# Patient Record
Sex: FEMALE | Race: BLACK OR AFRICAN AMERICAN | Employment: OTHER | URBAN - METROPOLITAN AREA
[De-identification: names, ages, dates, MRNs, and addresses within clinical notes are randomized per-mention and may not be internally consistent; named-entity substitution may affect disease eponyms.]

---

## 2017-01-10 ENCOUNTER — OFFICE VISIT (OUTPATIENT)
Dept: FAMILY MEDICINE CLINIC | Age: 47
End: 2017-01-10

## 2017-01-10 VITALS
SYSTOLIC BLOOD PRESSURE: 114 MMHG | OXYGEN SATURATION: 99 % | DIASTOLIC BLOOD PRESSURE: 80 MMHG | HEART RATE: 94 BPM | HEIGHT: 68 IN | BODY MASS INDEX: 26.52 KG/M2 | TEMPERATURE: 98 F | WEIGHT: 175 LBS

## 2017-01-10 DIAGNOSIS — R21 RASH: Primary | ICD-10-CM

## 2017-01-10 DIAGNOSIS — E66.3 OVERWEIGHT (BMI 25.0-29.9): ICD-10-CM

## 2017-01-10 DIAGNOSIS — B35.4 TINEA CORPORIS: ICD-10-CM

## 2017-01-10 RX ORDER — CLOTRIMAZOLE AND BETAMETHASONE DIPROPIONATE 10; .64 MG/G; MG/G
CREAM TOPICAL
Qty: 30 G | Refills: 0 | Status: SHIPPED | OUTPATIENT
Start: 2017-01-10 | End: 2017-04-03

## 2017-01-10 NOTE — MR AVS SNAPSHOT
Visit Information Date & Time Provider Department Dept. Phone Encounter #  
 1/10/2017 10:00 AM Carmel Tello, 0 [facility] [phone] [id number] Follow-up Instructions Return as needed. Upcoming Health Maintenance Date Due DTaP/Tdap/Td series (2 - Td) 1/1/2015 PAP AKA CERVICAL CYTOLOGY 5/5/2017 Allergies as of 1/10/2017  Review Complete On: 1/10/2017 By: Carmel Tello MD  
 No Known Allergies Current Immunizations  Reviewed on 12/9/2015 Name Date Influenza Nasal Vaccine 12/3/2014 Tdap 1/1/2005 Not reviewed this visit You Were Diagnosed With   
  
 Codes Comments Rash    -  Primary ICD-10-CM: R21 
ICD-9-CM: 782.1 Tinea corporis     ICD-10-CM: B35.4 ICD-9-CM: 110.5 Overweight (BMI 25.0-29. 9)     ICD-10-CM: R39.2 ICD-9-CM: 278.02 Vitals BP Pulse Temp Height(growth percentile) Weight(growth percentile) SpO2  
 114/80 (BP 1 Location: Left arm, BP Patient Position: Sitting) 94 98 °F (36.7 °C) (Oral) 5' 8\" (1.727 m) 175 lb (79.4 kg) 99% BMI OB Status Smoking Status 26.61 kg/m2 Perimenopausal Former Smoker Vitals History BMI and BSA Data Body Mass Index Body Surface Area  
 26.61 kg/m 2 1.95 m 2 Preferred Pharmacy Pharmacy Name Phone [facility][id number] Joy Shawn, [facility] [place] [place] [phone] Your Updated Medication List  
  
   
This list is accurate as of: 1/10/17 10:34 AM.  Always use your most recent med list.  
  
  
  
  
 cetirizine 10 mg tablet Commonly known as:  ZYRTEC Take 1 tablet by mouth daily. clotrimazole-betamethasone topical cream  
Commonly known as:  LOTRISONE  
apply to affected area twice daily for 2 weeks. fluticasone 50 mcg/actuation nasal spray Commonly known as:  Caffie Euler 2 Sprays by Both Nostrils route daily. ibuprofen 600 mg tablet Commonly known as:  MOTRIN  
 Take 1 Tab by mouth every eight (8) hours as needed for Pain. UNISOM (DOXYLAMINE) 25 mg tablet Generic drug:  doxylamine succinate Take 50 mg by mouth nightly as needed for Sleep. Prescriptions Printed Refills  
 clotrimazole-betamethasone (LOTRISONE) topical cream 0 Sig: apply to affected area twice daily for 2 weeks. Class: Print Follow-up Instructions Return as needed. Introducing [facility] SERVICES! Junie Toth introduces Mumum[employer]­o patient portal. Now you can access parts of your medical record, email your doctor's office, and request medication refills online. 1. In your internet browser, go to [email or web address] 2. Click on the First Time User? Click Here link in the Sign In box. You will see the New Member Sign Up page. 3. Enter your MumumÃ­o Access Code exactly as it appears below. You will not need to use this code after youve completed the sign-up process. If you do not sign up before the expiration date, you must request a new code. · MumumÃ­o Access Code: [email or web address] Expires: 1/15/2017  9:37 AM 
 
4. Enter the last four digits of your Social Security Number (xxxx) and Date of Birth (mm/dd/yyyy) as indicated and click Submit. You will be taken to the next sign-up page. 5. Create a MumumÃ­o ID. This will be your MumumÃ­o login ID and cannot be changed, so think of one that is secure and easy to remember. 6. Create a MumumÃ­o password. You can change your password at any time. 7. Enter your Password Reset Question and Answer. This can be used at a later time if you forget your password. 8. Enter your e-mail address. You will receive e-mail notification when new information is available in [address] 9. Click Sign Up. You can now view and download portions of your medical record. 10. Click the Download Summary menu link to download a portable copy of your medical information. If you have questions, please visit the Frequently Asked Questions section of the Phone Warriort website. Remember, [employer] is NOT to be used for urgent needs. For medical emergencies, dial 911. Now available from your iPhone and Android! Please provide this summary of care documentation to your next provider. Your primary care clinician is listed as Rehana Gardner. If you have any questions after today's visit, please call [phone].

## 2017-01-10 NOTE — PROGRESS NOTES
Chief Complaint   Patient presents with    Rash     right foot and left arm      1. Have you been to the ER, urgent care clinic since your last visit? Hospitalized since your last visit? No    2. Have you seen or consulted any other health care providers outside of the [facility] [place] since your last visit? Include any pap smears or colon screening.  No

## 2017-01-10 NOTE — PROGRESS NOTES
SUBJECTIVE  Chief Complaint   Patient presents with    Rash     right foot and left arm      Patient presents complaining of a rash for a few weeks. The patients reports the rash began on her right 5th toe after wearing certain boots. The patients reports the rash is pruritic. The patient denies any new exposures, including, soaps, detergents, foods, etc.   The patient denies any prior history of similar rash. She has more recently noticed two areas on her left arm that are lighter in color, scaly, and itchy. She wants to be referred to an endocrinologist for weight management. She is concerned that she is having difficulty losing weight. OBJECTIVE    Blood pressure 114/80, pulse 94, temperature 98 °F (36.7 °C), temperature source Oral, height 5' 8\" (1.727 m), weight 175 lb (79.4 kg), SpO2 99 %. General:  alert, cooperative, well appearing, in no apparent distress. Skin:  Her right 5th toe is dry and scaly. There is mild hyperpigmentation as well. Her left arm has 2 lesions that are hypopigmented and scaly. Each measures about 0.75cm in diameter  There are no vesicles or pustules. ASSESSMENT / PLAN    ICD-10-CM ICD-9-CM    1. Rash R21 782.1    2. Tinea corporis B35.4 110.5    3. Overweight (BMI 25.0-29. 9) E66.3 278.02      For her rash, she will start Lotrisone applied twice daily for 2 weeks, no longer. I have counseled her on the risk of prolonged corticosteroid use. She will monitor to resolution. She will start a daily moisturizer for the dry skin on her feet. She will RTC in 2 weeks should this spread or persist.     She requested a referral to endocrinology for weight loss. She says that she wants to lose weight so I suggested a nutritionist or dietician.   She is going to find out the name of the endocrinologist her friend sees for weight loss and then call my nurse for a referral.      15 minutes of face to face time spent with the patient with at least 50% on counseling on different etiologies of skin rashes and the treatment of ringworm and dry skin. Advised to keep up with her gynecologist for annual well woman exams. All chart history elements were reviewed by me at the time of the visit even though marked at time of note closure. Patient understands our medical plan. Patient has provided input and agrees with goals. Alternatives have been explained and offered. All questions answered. The patient is to call if condition worsens or fails to improve. Follow-up Disposition:  Return as needed.

## 2017-04-03 ENCOUNTER — OFFICE VISIT (OUTPATIENT)
Dept: FAMILY MEDICINE CLINIC | Age: 47
End: 2017-04-03

## 2017-04-03 VITALS
SYSTOLIC BLOOD PRESSURE: 98 MMHG | WEIGHT: 171 LBS | RESPIRATION RATE: 14 BRPM | OXYGEN SATURATION: 98 % | BODY MASS INDEX: 25.91 KG/M2 | DIASTOLIC BLOOD PRESSURE: 66 MMHG | HEIGHT: 68 IN | TEMPERATURE: 98.4 F | HEART RATE: 59 BPM

## 2017-04-03 DIAGNOSIS — B37.31 YEAST VAGINITIS: ICD-10-CM

## 2017-04-03 DIAGNOSIS — Z01.419 WELL WOMAN EXAM WITH ROUTINE GYNECOLOGICAL EXAM: ICD-10-CM

## 2017-04-03 DIAGNOSIS — Z13.220 SCREENING CHOLESTEROL LEVEL: ICD-10-CM

## 2017-04-03 DIAGNOSIS — B35.4 TINEA CORPORIS: Primary | ICD-10-CM

## 2017-04-03 RX ORDER — FLUCONAZOLE 150 MG/1
150 TABLET ORAL
Qty: 2 TAB | Refills: 0 | Status: SHIPPED | OUTPATIENT
Start: 2017-04-03 | End: 2017-04-11

## 2017-04-03 RX ORDER — KETOCONAZOLE 20 MG/G
CREAM TOPICAL 2 TIMES DAILY
Qty: 15 G | Refills: 0 | Status: SHIPPED | OUTPATIENT
Start: 2017-04-03 | End: 2017-04-17

## 2017-04-03 NOTE — PROGRESS NOTES
Chief Complaint   Patient presents with    Rash     left arm rash      1. Have you been to the ER, urgent care clinic since your last visit? Hospitalized since your last visit? No    2. Have you seen or consulted any other health care providers outside of the [address] [place] [address] since your last visit? Include any pap smears or colon screening. No      GYN is Dr. Sandy Wilson at [facility].

## 2017-04-03 NOTE — PATIENT INSTRUCTIONS

## 2017-04-03 NOTE — PROGRESS NOTES
SUBJECTIVE  Chief Complaint   Patient presents with    Rash     left arm rash      Patient presents complaining of a persistent rash now for a few months. The patients reports the rash began on her right 5th toe after wearing certain boots. The patients reports the rash is no longer pruritic on her foot. She says it improved some with the Lotrisone. The patient denies any new exposures, including, soaps, detergents, foods, etc.   The patient denies any prior history of similar rash. She has two areas on her left arm that are lighter in color, scaly, and itchy as well. Those did not change with treatment that she can recall. Requests referral to see OB/GYN. OBJECTIVE    Blood pressure 98/66, pulse (!) 59, temperature 98.4 °F (36.9 °C), temperature source Oral, resp. rate 14, height 5' 8\" (1.727 m), weight 171 lb (77.6 kg), last menstrual period 03/25/2017, SpO2 98 %. General:  alert, cooperative, well appearing, in no apparent distress. Skin:  Her right 5th toe is minimally dry and scaly. Her left arm has 2 lesions that are hypopigmented and scaly. Each measures about 0.75cm in diameter, one on her upper arm and the other on her forearm. There are no vesicles or pustules. ASSESSMENT / PLAN    ICD-10-CM ICD-9-CM    1. Tinea corporis B35.4 110.5 CBC W/O DIFF      METABOLIC PANEL, COMPREHENSIVE   2. Yeast vaginitis B37.3 112.1    3. Well woman exam with routine gynecological exam Z01.419 V72.31 REFERRAL TO OBSTETRICS AND GYNECOLOGY   4. Screening cholesterol level Z13.220 V77.91 LIPID PANEL     For her rash, she will take Diflucan 150mg po weekly for 2 doses. Start topical ketoconazole 2% bid for 2 weeks. She also reports that she has symptoms of a yeast infection vaginally. She is referred to OB/GYN for her well woman exam as well and to follow-up on her vaginal symptoms.       15 minutes of face to face time spent with the patient with at least 50% on counseling on different etiologies of skin rashes and the treatment of ringworm and dry skin. Advised to keep up with her gynecologist for annual well woman exams. All chart history elements were reviewed by me at the time of the visit even though marked at time of note closure. Patient understands our medical plan. Patient has provided input and agrees with goals. Alternatives have been explained and offered. All questions answered. The patient is to call if condition worsens or fails to improve. Follow-up Disposition:  Return in about 2 months (around 6/3/2017) for physical.  Fasting labs due 3-7 days prior to appointment .

## 2017-04-03 NOTE — MR AVS SNAPSHOT
Visit Information Date & Time Provider Department Dept. Phone Encounter #  
 4/3/2017 11:00 AM Kaveh Jiménez, [address] [facility] [address] [id number] Follow-up Instructions Return in about 2 months (around 6/3/2017) for physical.  Fasting labs due 3-7 days prior to appointment . Upcoming Health Maintenance Date Due DTaP/Tdap/Td series (2 - Td) 1/1/2015 PAP AKA CERVICAL CYTOLOGY 5/5/2017 Allergies as of 4/3/2017  Review Complete On: 4/3/2017 By: Mich Wogn LPN No Known Allergies Current Immunizations  Reviewed on 12/9/2015 Name Date Influenza Nasal Vaccine 12/3/2014 Tdap 1/1/2005 Not reviewed this visit You Were Diagnosed With   
  
 Codes Comments Tinea corporis    -  Primary ICD-10-CM: B35.4 ICD-9-CM: 110.5 Yeast vaginitis     ICD-10-CM: B37.3 ICD-9-CM: 112.1 Well woman exam with routine gynecological exam     ICD-10-CM: T68.611 ICD-9-CM: V72.31 Screening cholesterol level     ICD-10-CM: P40.026 ICD-9-CM: V77.91 Vitals BP Pulse Temp Resp Height(growth percentile) Weight(growth percentile) 98/66 (BP 1 Location: Right arm, BP Patient Position: Sitting) (!) 59 98.4 °F (36.9 °C) (Oral) 14 5' 8\" (1.727 m) 171 lb (77.6 kg) LMP SpO2 BMI OB Status Smoking Status 03/25/2017 98% 26 kg/m2 Perimenopausal Former Smoker Vitals History BMI and BSA Data Body Mass Index Body Surface Area  
 26 kg/m 2 1.93 m 2 Preferred Pharmacy Pharmacy Name Phone [id number] Caridadnico Andrew, [address] [facility] [place] [facility] [phone] Your Updated Medication List  
  
   
This list is accurate as of: 4/3/17 11:39 AM.  Always use your most recent med list.  
  
  
  
  
 cetirizine 10 mg tablet Commonly known as:  ZYRTEC Take 1 tablet by mouth daily. fluconazole 150 mg tablet Commonly known as:  DIFLUCAN  
 Take 1 Tab by mouth every seven (7) days for 2 doses. FDA advises cautious prescribing of oral fluconazole in pregnancy. fluticasone 50 mcg/actuation nasal spray Commonly known as:  Murray Aleisha 2 Sprays by Both Nostrils route daily. ibuprofen 600 mg tablet Commonly known as:  MOTRIN Take 1 Tab by mouth every eight (8) hours as needed for Pain.  
  
 ketoconazole 2 % topical cream  
Commonly known as:  NIZORAL Apply  to affected area two (2) times a day for 14 days. UNISOM (DOXYLAMINE) 25 mg tablet Generic drug:  doxylamine succinate Take 50 mg by mouth nightly as needed for Sleep. Prescriptions Printed Refills  
 fluconazole (DIFLUCAN) 150 mg tablet 0 Sig: Take 1 Tab by mouth every seven (7) days for 2 doses. FDA advises cautious prescribing of oral fluconazole in pregnancy. Class: Print Route: Oral  
 ketoconazole (NIZORAL) 2 % topical cream 0 Sig: Apply  to affected area two (2) times a day for 14 days. Class: Print Route: Topical  
  
We Performed the Following REFERRAL TO OBSTETRICS AND GYNECOLOGY [REF51 Custom] Comments:  
 Please evaluate patient for well woman, Dr. Escobar [facility]). Follow-up Instructions Return in about 2 months (around 6/3/2017) for physical.  Fasting labs due 3-7 days prior to appointment . To-Do List   
 04/03/2017 Lab:  CBC W/O DIFF   
  
 04/03/2017 Lab:  LIPID PANEL   
  
 04/03/2017 Lab:  METABOLIC PANEL, COMPREHENSIVE   
  
 04/17/2017 11:15 AM  
  Appointment with LYNNETTE DE LA CRUZ 2 at [address] [place] [address] ([phone]) OUTSIDE FILMS  - Any outside films related to the study being scheduled should be brought with you on the day of the exam.  If this cannot be done there may be a delay in the reading of the study.   MEDICATIONS  - Patient must bring a complete list of all medications currently taking to include prescriptions, over-the-counter meds, herbals, vitamins & any dietary supplements  GENERAL INSTRUCTIONS  - On the day of your exam do not use any bath powder, deodorant or lotions on the armpit area. -Tenderness of breasts may cause an increase of discomfort during procedure. If you are experiencing breast tenderness on the day of your appointment and would like to reschedule, please call [phone]. Referral Information Referral ID Referred By Referred To  
  
 [id number] Alexia Mendoza Not Available Visits Status Start Date End Date 1 New Request 4/3/17 4/3/18 If your referral has a status of pending review or denied, additional information will be sent to support the outcome of this decision. Patient Instructions A Healthy Lifestyle: Care Instructions Your Care Instructions A healthy lifestyle can help you feel good, stay at a healthy weight, and have plenty of energy for both work and play. A healthy lifestyle is something you can share with your whole family. A healthy lifestyle also can lower your risk for serious health problems, such as high blood pressure, heart disease, and diabetes. You can follow a few steps listed below to improve your health and the health of your family. Follow-up care is a key part of your treatment and safety. Be sure to make and go to all appointments, and call your doctor if you are having problems. Its also a good idea to know your test results and keep a list of the medicines you take. How can you care for yourself at home? · Do not eat too much sugar, fat, or fast foods. You can still have dessert and treats now and then. The goal is moderation. · Start small to improve your eating habits. Pay attention to portion sizes, drink less juice and soda pop, and eat more fruits and vegetables. ¨ Eat a healthy amount of food. A 3-ounce serving of meat, for example, is about the size of a deck of cards.  Fill the rest of your plate with vegetables and whole grains. ¨ Limit the amount of soda and sports drinks you have every day. Drink more water when you are thirsty. ¨ Eat at least 5 servings of fruits and vegetables every day. It may seem like a lot, but it is not hard to reach this goal. A serving or helping is 1 piece of fruit, 1 cup of vegetables, or 2 cups of leafy, raw vegetables. Have an apple or some carrot sticks as an afternoon snack instead of a candy bar. Try to have fruits and/or vegetables at every meal. 
· Make exercise part of your daily routine. You may want to start with simple activities, such as walking, bicycling, or slow swimming. Try to be active 30 to 60 minutes every day. You do not need to do all 30 to 60 minutes all at once. For example, you can exercise 3 times a day for 10 or 20 minutes. Moderate exercise is safe for most people, but it is always a good idea to talk to your doctor before starting an exercise program. 
· Keep moving. [employer] the lawn, work in the garden, or [employer]. Take the stairs instead of the elevator at work. · If you smoke, quit. People who smoke have an increased risk for heart attack, stroke, cancer, and other lung illnesses. Quitting is hard, but there are ways to boost your chance of quitting tobacco for good. ¨ Use nicotine gum, patches, or lozenges. ¨ Ask your doctor about stop-smoking programs and medicines. ¨ Keep trying. In addition to reducing your risk of diseases in the future, you will notice some benefits soon after you stop using tobacco. If you have shortness of breath or asthma symptoms, they will likely get better within a few weeks after you quit. · Limit how much alcohol you drink. Moderate amounts of alcohol (up to 2 drinks a day for men, 1 drink a day for women) are okay. But drinking too much can lead to liver problems, high blood pressure, and other health problems. Family health If you have a family, there are many things you can do together to improve your health. · Eat meals together as a family as often as possible. · Eat healthy foods. This includes fruits, vegetables, lean meats and dairy, and whole grains. · Include your family in your fitness plan. Most people think of activities such as jogging or tennis as the way to fitness, but there are many ways you and your family can be more active. Anything that makes you breathe hard and gets your heart pumping is exercise. Here are some tips: 
¨ Walk to do errands or to take your child to school or the bus. ¨ Go for a family bike ride after dinner instead of watching TV. Where can you learn more? Go to http://robinsonArchipelagopete.info/. Enter R100 in the search box to learn more about \"A Healthy Lifestyle: Care Instructions. \" Current as of: July 26, 2016 Content Version: 11.2 © 4536-6230 [employer]. Care instructions adapted under license by [employer] (which disclaims liability or warranty for this information). If you have questions about a medical condition or this instruction, always ask your healthcare professional. [place] [address] any warranty or liability for your use of this information. Introducing [facility]! [employer] introduces LegalF[employer]cil patient portal. Now you can access parts of your medical record, email your doctor's office, and request medication refills online. 1. In your internet browser, go to [email or web address] 2. Click on the First Time User? Click Here link in the Sign In box. You will see the New Member Sign Up page. 3. Enter your [employer]cil Access Code exactly as it appears below. You will not need to use this code after youve completed the sign-up process. If you do not sign up before the expiration date, you must request a new code. · [employer] Access Code: [email or web address] Expires: 6/25/2017  3:28 PM 
 
4.  Enter the last four digits of your Social Security Number (xxxx) and Date of Birth (mm/dd/yyyy) as indicated and click Submit. You will be taken to the next sign-up page. 5. Create a ComptTIA ID. This will be your ComptTIA login ID and cannot be changed, so think of one that is secure and easy to remember. 6. Create a ComptTIA password. You can change your password at any time. 7. Enter your Password Reset Question and Answer. This can be used at a later time if you forget your password. 8. Enter your e-mail address. You will receive e-mail notification when new information is available in [address] 9. Click Sign Up. You can now view and download portions of your medical record. 10. Click the Download Summary menu link to download a portable copy of your medical information. If you have questions, please visit the Frequently Asked Questions section of the ComptTIA website. Remember, ComptTIA is NOT to be used for urgent needs. For medical emergencies, dial 911. Now available from your iPhone and Android! Please provide this summary of care documentation to your next provider. Your primary care clinician is listed as Brennen Joel. If you have any questions after today's visit, please call [phone].

## 2017-04-17 ENCOUNTER — HOSPITAL ENCOUNTER (OUTPATIENT)
Dept: MAMMOGRAPHY | Age: 47
Discharge: HOME | End: 2017-04-17
Attending: FAMILY MEDICINE
Payer: OTHER GOVERNMENT

## 2017-04-17 DIAGNOSIS — Z12.31 VISIT FOR SCREENING MAMMOGRAM: ICD-10-CM

## 2017-04-17 PROCEDURE — 77067 SCR MAMMO BI INCL CAD: CPT

## 2017-05-26 ENCOUNTER — HOSPITAL ENCOUNTER (OUTPATIENT)
Dept: LAB | Age: 47
Discharge: HOME | End: 2017-05-26
Payer: OTHER GOVERNMENT

## 2017-05-26 LAB
ALBUMIN SERPL BCP-MCNC: 3.6 G/DL (ref 3.4–5)
ALBUMIN/GLOB SERPL: 0.9 {RATIO} (ref 0.8–1.7)
ALP SERPL-CCNC: 59 U/L (ref 45–117)
ALT SERPL-CCNC: 17 U/L (ref 13–56)
ANION GAP BLD CALC-SCNC: 7 MMOL/L (ref 3–18)
AST SERPL W P-5'-P-CCNC: 14 U/L (ref 15–37)
BILIRUB SERPL-MCNC: 0.5 MG/DL (ref 0.2–1)
BUN SERPL-MCNC: 16 MG/DL (ref 7–18)
BUN/CREAT SERPL: 23 (ref 12–20)
CALCIUM SERPL-MCNC: 9.1 MG/DL (ref 8.5–10.1)
CHLORIDE SERPL-SCNC: 104 MMOL/L (ref 100–108)
CHOLEST SERPL-MCNC: 146 MG/DL
CO2 SERPL-SCNC: 29 MMOL/L (ref 21–32)
CREAT SERPL-MCNC: 0.7 MG/DL (ref 0.6–1.3)
ERYTHROCYTE [DISTWIDTH] IN BLOOD BY AUTOMATED COUNT: 13.2 % (ref 11.6–14.5)
GLOBULIN SER CALC-MCNC: 3.9 G/DL (ref 2–4)
GLUCOSE SERPL-MCNC: 90 MG/DL (ref 74–99)
HCT VFR BLD AUTO: 41.2 % (ref 35–45)
HDLC SERPL-MCNC: 60 MG/DL (ref 40–60)
HDLC SERPL: 2.4 {RATIO} (ref 0–5)
HGB BLD-MCNC: 13.6 G/DL (ref 12–16)
LDLC SERPL CALC-MCNC: 76.6 MG/DL (ref 0–100)
LIPID PROFILE,FLP: NORMAL
MCH RBC QN AUTO: 28 PG (ref 24–34)
MCHC RBC AUTO-ENTMCNC: 33 G/DL (ref 31–37)
MCV RBC AUTO: 84.8 FL (ref 74–97)
PLATELET # BLD AUTO: 232 K/UL (ref 135–420)
PMV BLD AUTO: 9.7 FL (ref 9.2–11.8)
POTASSIUM SERPL-SCNC: 4.2 MMOL/L (ref 3.5–5.5)
PROT SERPL-MCNC: 7.5 G/DL (ref 6.4–8.2)
RBC # BLD AUTO: 4.86 M/UL (ref 4.2–5.3)
SODIUM SERPL-SCNC: 140 MMOL/L (ref 136–145)
TRIGL SERPL-MCNC: 47 MG/DL (ref ?–150)
VLDLC SERPL CALC-MCNC: 9.4 MG/DL
WBC # BLD AUTO: 3.5 K/UL (ref 4.6–13.2)

## 2017-05-26 PROCEDURE — 80061 LIPID PANEL: CPT | Performed by: FAMILY MEDICINE

## 2017-05-26 PROCEDURE — 85027 COMPLETE CBC AUTOMATED: CPT | Performed by: FAMILY MEDICINE

## 2017-05-26 PROCEDURE — 80053 COMPREHEN METABOLIC PANEL: CPT | Performed by: FAMILY MEDICINE

## 2017-05-26 PROCEDURE — 36415 COLL VENOUS BLD VENIPUNCTURE: CPT | Performed by: FAMILY MEDICINE

## 2017-06-05 ENCOUNTER — OFFICE VISIT (OUTPATIENT)
Dept: FAMILY MEDICINE CLINIC | Age: 47
End: 2017-06-05

## 2017-06-05 VITALS
TEMPERATURE: 98.4 F | DIASTOLIC BLOOD PRESSURE: 70 MMHG | HEIGHT: 68 IN | SYSTOLIC BLOOD PRESSURE: 102 MMHG | HEART RATE: 73 BPM | RESPIRATION RATE: 16 BRPM | BODY MASS INDEX: 25.16 KG/M2 | OXYGEN SATURATION: 100 % | WEIGHT: 166 LBS

## 2017-06-05 DIAGNOSIS — Z23 NEED FOR TDAP VACCINATION: ICD-10-CM

## 2017-06-05 DIAGNOSIS — Z00.00 WELL WOMAN EXAM (NO GYNECOLOGICAL EXAM): Primary | ICD-10-CM

## 2017-06-05 DIAGNOSIS — F17.200 SMOKER: ICD-10-CM

## 2017-06-05 DIAGNOSIS — B35.4 TINEA CORPORIS: ICD-10-CM

## 2017-06-05 RX ORDER — VARENICLINE TARTRATE 25 MG
KIT ORAL
Qty: 1 DOSE PACK | Refills: 0 | Status: SHIPPED | OUTPATIENT
Start: 2017-06-05 | End: 2018-06-08

## 2017-06-05 NOTE — PROGRESS NOTES
1When: none Where: none Reason for visit: none. Have you been to the ER, urgent care clinic since your last visit? Hospitalized since your last visit? No    2. Have you seen or consulted any other health care providers outside of the [address] [place] [address] since your last visit? Include 04- pap smears or colon screening.  Yes When: 04- Where: [facility] (OB-GYN) Reason for visit: well woman/pap smear     Last Tdap unknown

## 2017-06-05 NOTE — PATIENT INSTRUCTIONS

## 2017-06-05 NOTE — MR AVS SNAPSHOT
Visit Information Date & Time Provider Department Dept. Phone Encounter #  
 6/5/2017  9:30 AM Glen Worrell, [address] [id number] Follow-up Instructions Return in about 1 year (around 6/5/2018) for physical (Pap Smear). Upcoming Health Maintenance Date Due DTaP/Tdap/Td series (2 - Td) 1/1/2015 INFLUENZA AGE 9 TO ADULT 8/1/2017 PAP AKA CERVICAL CYTOLOGY 4/28/2020 Allergies as of 6/5/2017  Review Complete On: 6/5/2017 By: Glen Worrell MD  
 No Known Allergies Current Immunizations  Reviewed on 6/5/2017 Name Date Influenza Nasal Vaccine 12/3/2014 Tdap 6/5/2017, 1/1/2005 Reviewed by Anita Chacko on 6/5/2017 at  9:33 AM  
You Were Diagnosed With   
  
 Codes Comments Well woman exam (no gynecological exam)    -  Primary ICD-10-CM: Z00.00 ICD-9-CM: V70.0 [V70.0] Smoker     ICD-10-CM: J94.507 ICD-9-CM: 305.1 Tinea corporis     ICD-10-CM: B35.4 ICD-9-CM: 110.5 Need for Tdap vaccination     ICD-10-CM: R58 ICD-9-CM: V06.1 Vitals BP Pulse Temp Resp Height(growth percentile) Weight(growth percentile) 102/70 (BP 1 Location: Left arm, BP Patient Position: Sitting) 73 98.4 °F (36.9 °C) (Oral) 16 5' 8\" (1.727 m) 166 lb (75.3 kg) SpO2 BMI OB Status Smoking Status 100% 25.24 kg/m2 Perimenopausal Former Smoker Vitals History BMI and BSA Data Body Mass Index Body Surface Area  
 25.24 kg/m 2 1.9 m 2 Preferred Pharmacy Pharmacy Name Phone [id number], [address] [place] [address],[address] [facility] [phone] Your Updated Medication List  
  
   
This list is accurate as of: 6/5/17 10:25 AM.  Always use your most recent med list.  
  
  
  
  
 cetirizine 10 mg tablet Commonly known as:  ZYRTEC Take 1 tablet by mouth daily. fluticasone 50 mcg/actuation nasal spray Commonly known as:  Gudelia Ashvin 2 Sprays by Both Nostrils route daily. ibuprofen 600 mg tablet Commonly known as:  MOTRIN Take 1 Tab by mouth every eight (8) hours as needed for Pain. UNISOM (DOXYLAMINE) 25 mg tablet Generic drug:  doxylamine succinate Take 50 mg by mouth nightly as needed for Sleep.  
  
 varenicline 0.5 mg (11)- 1 mg (42) Dspk Commonly known as:  CHANTIX STARTER KALEB Use started pack as directed Prescriptions Printed Refills  
 varenicline (CHANTIX STARTER KALEB) 0.5 mg (11)- 1 mg (42) DsPk 0 Sig: Use started pack as directed Class: Print We Performed the Following TETANUS, DIPHTHERIA TOXOIDS AND ACELLULAR PERTUSSIS VACCINE (TDAP), IN INDIVIDS. >=7, IM [id number] CPT(R)] Follow-up Instructions Return in about 1 year (around 6/5/2018) for physical (Pap Smear). Patient Instructions Well Visit, Ages 25 to 48: Care Instructions Your Care Instructions Physical exams can help you stay healthy. Your doctor has checked your overall health and may have suggested ways to take good care of yourself. He or she also may have recommended tests. At home, you can help prevent illness with healthy eating, regular exercise, and other steps. Follow-up care is a key part of your treatment and safety. Be sure to make and go to all appointments, and call your doctor if you are having problems. It's also a good idea to know your test results and keep a list of the medicines you take. How can you care for yourself at home? · Reach and stay at a healthy weight. This will lower your risk for many problems, such as obesity, diabetes, heart disease, and high blood pressure. · Get at least 30 minutes of physical activity on most days of the week. Walking is a good choice. You also may want to do other activities, such as running, swimming, cycling, or playing tennis or team sports. Discuss any changes in your exercise program with your doctor. · Do not smoke or allow others to smoke around you.  If you need help quitting, talk to your doctor about stop-smoking programs and medicines. These can increase your chances of quitting for good. · Talk to your doctor about whether you have any risk factors for sexually transmitted infections (STIs). Having one sex partner (who does not have STIs and does not have sex with anyone else) is a good way to avoid these infections. · Use birth control if you do not want to have children at this time. Talk with your doctor about the choices available and what might be best for you. · Protect your skin from too much sun. When you're outdoors from 10 a.m. to 4 p.m., stay in the shade or cover up with clothing and a hat with a wide brim. Wear sunglasses that block UV rays. Even when it's cloudy, put broad-spectrum sunscreen (SPF 30 or higher) on any exposed skin. · See a dentist one or two times a year for checkups and to have your teeth cleaned. · Wear a seat belt in the car. · Drink alcohol in moderation, if at all. That means no more than 2 drinks a day for men and 1 drink a day for women. Follow your doctor's advice about when to have certain tests. These tests can spot problems early. For everyone · Cholesterol. Have the fat (cholesterol) in your blood tested after age 21. Your doctor will tell you how often to have this done based on your age, family history, or other things that can increase your risk for heart disease. · Blood pressure. Have your blood pressure checked during a routine doctor visit. Your doctor will tell you how often to check your blood pressure based on your age, your blood pressure results, and other factors. · Vision. Talk with your doctor about how often to have a glaucoma test. 
· Diabetes. Ask your doctor whether you should have tests for diabetes. · Colon cancer. Have a test for colon cancer at age 48. You may have one of several tests.  If you are younger than 48, you may need a test earlier if you have any risk factors. Risk factors include whether you already had a precancerous polyp removed from your colon or whether your parent, brother, sister, or child has had colon cancer. For women · Breast exam and mammogram. Talk to your doctor about when you should have a clinical breast exam and a mammogram. Medical experts differ on whether and how often women under 50 should have these tests. Your doctor can help you decide what is right for you. · Pap test and pelvic exam. Begin Pap tests at age 24. A Pap test is the best way to find cervical cancer. The test often is part of a pelvic exam. Ask how often to have this test. 
· Tests for sexually transmitted infections (STIs). Ask whether you should have tests for STIs. You may be at risk if you have sex with more than one person, especially if your partners do not wear condoms. For men · Tests for sexually transmitted infections (STIs). Ask whether you should have tests for STIs. You may be at risk if you have sex with more than one person, especially if you do not wear a condom. · Testicular cancer exam. Ask your doctor whether you should check your testicles regularly. · Prostate exam. Talk to your doctor about whether you should have a blood test (called a PSA test) for prostate cancer. Experts differ on whether and when men should have this test. Some experts suggest it if you are older than 39 and are -American or have a father or brother who got prostate cancer when he was younger than 72. When should you call for help? Watch closely for changes in your health, and be sure to contact your doctor if you have any problems or symptoms that concern you. Where can you learn more? Go to http://robinson-pete.info/. Enter P072 in the search box to learn more about \"Well Visit, Ages 25 to 48: Care Instructions. \" Current as of: July 19, 2016 Content Version: 11.2 © 0309-8935 [employer]. Care instructions adapted under license by [employer] (which disclaims liability or warranty for this information). If you have questions about a medical condition or this instruction, always ask your healthcare professional. [address] any warranty or liability for your use of this information. Follow up in 1 year for physical  
 
  
Introducing [facility]! [facility] introduces [employer] patient portal. Now you can access parts of your medical record, email your doctor's office, and request medication refills online. 1. In your internet browser, go to [email or web address] 2. Click on the First Time User? Click Here link in the Sign In box. You will see the New Member Sign Up page. 3. Enter your [employer] Access Code exactly as it appears below. You will not need to use this code after youve completed the sign-up process. If you do not sign up before the expiration date, you must request a new code. · [employer] Access Code: [email or web address] Expires: 6/25/2017  3:28 PM 
 
4. Enter the last four digits of your Social Security Number (xxxx) and Date of Birth (mm/dd/yyyy) as indicated and click Submit. You will be taken to the next sign-up page. 5. Create a [employer] ID. This will be your [employer] login ID and cannot be changed, so think of one that is secure and easy to remember. 6. Create a [employer] password. You can change your password at any time. 7. Enter your Password Reset Question and Answer. This can be used at a later time if you forget your password. 8. Enter your e-mail address. You will receive e-mail notification when new information is available in [address] 9. Click Sign Up. You can now view and download portions of your medical record. 10. Click the Download Summary menu link to download a portable copy of your medical information. If you have questions, please visit the Frequently Asked Questions section of the [employer] website. Remember, [facility] is NOT to be used for urgent needs. For medical emergencies, dial 911. Now available from your iPhone and Android! Please provide this summary of care documentation to your next provider. Your primary care clinician is listed as Ken Thacker. If you have any questions after today's visit, please call [phone].

## 2017-06-05 NOTE — PROGRESS NOTES
Subjective:   52 y.o. female for Well Woman Check. Her gyne and breast care is done elsewhere by [place] Ob[place]Gyn [place]. Current Outpatient Prescriptions   Medication Sig Dispense Refill    varenicline (CHANTIX STARTER KALEB) 0.5 mg (11)- 1 mg (42) DsPk Use started pack as directed 1 Dose Pack 0    doxylamine succinate (UNISOM, DOXYLAMINE,) 25 mg tablet Take 50 mg by mouth nightly as needed for Sleep.  ibuprofen (MOTRIN) 600 mg tablet Take 1 Tab by mouth every eight (8) hours as needed for Pain. 60 Tab 0    cetirizine (ZYRTEC) 10 mg tablet Take 1 tablet by mouth daily. (Patient taking differently: Take 10 mg by mouth daily as needed.) 30 tablet 11    fluticasone (FLONASE) 50 mcg/actuation nasal spray 2 Sprays by Both Nostrils route daily. (Patient taking differently: 2 Sprays by Both Nostrils route daily as needed.) 1 Bottle 2     No Known Allergies  Past Medical History:   Diagnosis Date    Endometriosis     Fibroids 2008    s/p ablation 08, takes naproxen regularly for menstrual cramping    Genital HSV 2014    Shoulder disorder 2016    left, rtc tendinosis, bursitis, AC arthritis, labral pathology     Past Surgical History:   Procedure Laterality Date    HX OTHER SURGICAL  approx 2007    fibroid embolization      Family History   Problem Relation Age of Onset    Hypertension Mother     Heart Disease Mother     Hypertension Father      Social History   Substance Use Topics    Smoking status: Former Smoker     Packs/day: 0.50     Years: 10.00     Types: Cigarettes     Quit date: 1/1/2014    Smokeless tobacco: Never Used    Alcohol use Yes      Comment: \"occasional\"      ROS: Feeling generally well. No TIA's or unusual headaches, no dysphagia. No prolonged cough. No dyspnea or chest pain on exertion. No abdominal pain, change in bowel habits, black or bloody stools. No urinary tract symptoms. No new or unusual musculoskeletal symptoms.     Specific concerns today: Tinea rash is no longer scaly and itchy. On her arms, the two lesions are still hypopigmented though. No other complaints. Objective: The patient appears well, alert, oriented x 3, in no distress. Visit Vitals    /70 (BP 1 Location: Left arm, BP Patient Position: Sitting)    Pulse 73    Temp 98.4 °F (36.9 °C) (Oral)    Resp 16    Ht 5' 8\" (1.727 m)    Wt 166 lb (75.3 kg)    SpO2 100%    BMI 25.24 kg/m2     ENT normal.  Neck supple. No thyromegaly. LEANNE. Lungs are clear, good air entry, no wheezes, rhonchi or rales. S1 and S2 normal, no murmurs, regular rate and rhythm. Abdomen soft without tenderness, guarding, mass or organomegaly. Extremities show no edema, normal peripheral pulses. Neurological is normal, no focal findings. Skin:  There are two 1cm hypopigmented areas on her left arm - one near deltoid, the other on mid-forearm. Psych: normal affect. Mood good. Oriented x 3. Judgement and insight intact. Lymph:  No adenopathy. Breast and Pelvic exams are deferred.     Results for orders placed or performed during the hospital encounter of 05/26/17   CBC W/O DIFF   Result Value Ref Range    WBC 3.5 (L) 4.6 - 13.2 K/uL    RBC 4.86 4.20 - 5.30 M/uL    HGB 13.6 12.0 - 16.0 g/dL    HCT 41.2 35.0 - 45.0 %    MCV 84.8 74.0 - 97.0 FL    MCH 28.0 24.0 - 34.0 PG    MCHC 33.0 31.0 - 37.0 g/dL    RDW 13.2 11.6 - 14.5 %    PLATELET 486 027 - 412 K/uL    MPV 9.7 9.2 - 11.8 FL   LIPID PANEL   Result Value Ref Range    LIPID PROFILE          Cholesterol, total 146 <200 MG/DL    Triglyceride 47 <150 MG/DL    HDL Cholesterol 60 40 - 60 MG/DL    LDL, calculated 76.6 0 - 100 MG/DL    VLDL, calculated 9.4 MG/DL    CHOL/HDL Ratio 2.4 0 - 5.0     METABOLIC PANEL, COMPREHENSIVE   Result Value Ref Range    Sodium 140 136 - 145 mmol/L    Potassium 4.2 3.5 - 5.5 mmol/L    Chloride 104 100 - 108 mmol/L    CO2 29 21 - 32 mmol/L    Anion gap 7 3.0 - 18 mmol/L    Glucose 90 74 - 99 mg/dL    BUN 16 7.0 - 18 MG/DL    Creatinine 0.70 0.6 - 1.3 MG/DL    BUN/Creatinine ratio 23 (H) 12 - 20      GFR est AA >60 >60 ml/min/1.73m2    GFR est non-AA >60 >60 ml/min/1.73m2    Calcium 9.1 8.5 - 10.1 MG/DL    Bilirubin, total 0.5 0.2 - 1.0 MG/DL    ALT (SGPT) 17 13 - 56 U/L    AST (SGOT) 14 (L) 15 - 37 U/L    Alk. phosphatase 59 45 - 117 U/L    Protein, total 7.5 6.4 - 8.2 g/dL    Albumin 3.6 3.4 - 5.0 g/dL    Globulin 3.9 2.0 - 4.0 g/dL    A-G Ratio 0.9 0.8 - 1.7       Assessment/Plan:       ICD-10-CM ICD-9-CM    1. Well woman exam (no gynecological exam) Z00.00 V70.0     [V70.0]   2. Smoker F17.200 305.1    3. Tinea corporis B35.4 110.5    4. Need for Tdap vaccination Z23 V06.1 TETANUS, DIPHTHERIA TOXOIDS AND ACELLULAR PERTUSSIS VACCINE (TDAP), IN INDIVIDS. >=7, IM     Age and sex specific counseling - pt ed handout. Labs reviewed. Chantix requested due to e-cig use. She will start with the smoking cessation classes at [facility]. Tinea corporis-  Monitor as pigment may take several months to return. If worsens, please let us know. Tdap administered. All chart history elements were reviewed by me at the time of the visit even though marked at time of note closure. Patient understands our medical plan. Patient has provided input and agrees with goals. Alternatives have been explained and offered. All questions answered. The patient is to call if condition worsens or fails to improve. Follow-up yearly.

## 2017-07-06 ENCOUNTER — OFFICE VISIT (OUTPATIENT)
Dept: FAMILY MEDICINE CLINIC | Age: 47
End: 2017-07-06

## 2017-07-06 VITALS
OXYGEN SATURATION: 96 % | RESPIRATION RATE: 14 BRPM | WEIGHT: 164 LBS | SYSTOLIC BLOOD PRESSURE: 104 MMHG | HEART RATE: 86 BPM | HEIGHT: 68 IN | BODY MASS INDEX: 24.86 KG/M2 | DIASTOLIC BLOOD PRESSURE: 76 MMHG | TEMPERATURE: 98.8 F

## 2017-07-06 DIAGNOSIS — S46.819A TRAPEZIUS MUSCLE STRAIN, UNSPECIFIED LATERALITY, INITIAL ENCOUNTER: ICD-10-CM

## 2017-07-06 DIAGNOSIS — G44.219 EPISODIC TENSION-TYPE HEADACHE, NOT INTRACTABLE: Primary | ICD-10-CM

## 2017-07-06 NOTE — MR AVS SNAPSHOT
Visit Information Date & Time Provider Department Dept. Phone Encounter #  
 7/6/2017  3:30 PM Malinda Bustillos, [address] [id number] Follow-up Instructions Return in about 1 month (around 8/6/2017) for shoulder pain. Upcoming Health Maintenance Date Due INFLUENZA AGE 9 TO ADULT 8/1/2017 PAP AKA CERVICAL CYTOLOGY 4/28/2020 DTaP/Tdap/Td series (3 - Td) 6/5/2027 Allergies as of 7/6/2017  Review Complete On: 6/5/2017 By: Malinda Bustillos MD  
 No Known Allergies Current Immunizations  Reviewed on 6/5/2017 Name Date Influenza Nasal Vaccine 12/3/2014 Tdap 6/5/2017, 1/1/2005 Not reviewed this visit You Were Diagnosed With   
  
 Codes Comments Episodic tension-type headache, not intractable    -  Primary ICD-10-CM: G99.930 ICD-9-CM: 339.11 Trapezius muscle strain, unspecified laterality, initial encounter     ICD-10-CM: V60.852K 
ICD-9-CM: 840.8 Vitals BP Pulse Temp Resp Height(growth percentile) Weight(growth percentile) 104/76 (BP 1 Location: Left arm, BP Patient Position: Sitting) 86 98.8 °F (37.1 °C) (Oral) 14 5' 8\" (1.727 m) 164 lb (74.4 kg) SpO2 BMI OB Status Smoking Status 96% 24.94 kg/m2 Perimenopausal Former Smoker Vitals History BMI and BSA Data Body Mass Index Body Surface Area 24.94 kg/m 2 1.89 m 2 Preferred Pharmacy Pharmacy Name Phone [facility] #[id number] [facility], [address] [place] [address],[address] [facility] [phone] Your Updated Medication List  
  
   
This list is accurate as of: 7/6/17  3:46 PM.  Always use your most recent med list.  
  
  
  
  
 cetirizine 10 mg tablet Commonly known as:  ZYRTEC Take 1 tablet by mouth daily. fluticasone 50 mcg/actuation nasal spray Commonly known as:  Chio Hilario 2 Sprays by Both Nostrils route daily. ibuprofen 600 mg tablet Commonly known as:  MOTRIN  
 Take 1 Tab by mouth every eight (8) hours as needed for Pain. UNISOM (DOXYLAMINE) 25 mg tablet Generic drug:  doxylamine succinate Take 50 mg by mouth nightly as needed for Sleep.  
  
 varenicline 0.5 mg (11)- 1 mg (42) Dspk Commonly known as:  CHANTIX STARTER KALEB Use started pack as directed We Performed the Following REFERRAL TO PHYSICAL THERAPY [OEE35 Custom] Comments:  
 Please evaluate patient for trapezius Follow-up Instructions Return in about 1 month (around 8/6/2017) for shoulder pain. To-Do List   
 07/06/2017 Imaging:  MRI BRAIN WO CONT Referral Information Referral ID Referred By Referred To  
  
 [id number] RYLAN, [address] [place] [address],[address] [facility]   
   [id number] [address] [place] [address] Road Phone: [phone] Visits Status Start Date End Date 1 New Request 7/6/17 7/6/18 If your referral has a status of pending review or denied, additional information will be sent to support the outcome of this decision. Patient Instructions Tension Headache: Care Instructions Your Care Instructions Most headaches are tension headaches. These headaches tend to happen again, especially if you are under stress. A tension headache may cause pain or a feeling of pressure all over your head. You probably can't pinpoint the center of the pain. If you keep getting tension headaches, the best thing you can do to limit them is to find out what is causing them and then make changes in those areas. Follow-up care is a key part of your treatment and safety. Be sure to make and go to all appointments, and call your doctor if you are having problems. Its also a good idea to know your test results and keep a list of the medicines you take. How can you care for yourself at home?  
· Rest in a quiet, dark room with a cool cloth on your forehead until your headache is gone. Close your eyes, and try to relax or go to sleep. Don't watch TV or read. Avoid using the computer. · Use a warm, moist towel or a heating pad set on low to relax tight shoulder and neck muscles. · Have someone gently massage your neck and shoulders. · Take pain medicines exactly as directed. ¨ If the doctor gave you a prescription medicine for pain, take it as prescribed. ¨ If you are not taking a prescription pain medicine, ask your doctor if you can take an over-the-counter medicine. · Be careful not to take pain medicine more often than the instructions allow, because you may get worse or more frequent headaches when the medicine wears off. · If you get another tension headache, stop what you are doing and sit quietly for a moment. Close your eyes and breathe slowly. Try to relax your head and neck muscles. · Do not ignore new symptoms that occur with a headache, such as fever, weakness or numbness, vision changes, or confusion. These may be signs of a more serious problem. To help prevent headaches · Keep a headache diary so you can figure out what triggers your headaches. Avoiding triggers may help you prevent headaches. Record when each headache began, how long it lasted, and what the pain was like (throbbing, aching, stabbing, or dull). List anything that may have triggered the headache, such as being physically or emotionally stressed or being anxious or depressed. Other possible triggers are hunger, anger, fatigue, poor posture, and muscle strain. · Find healthy ways to deal with stress. Headaches are most common during or right after stressful times. Take time to relax before and after you do something that has caused a headache in the past. 
· Exercise daily to relieve stress. Relaxation exercises may help reduce tension. · Get plenty of sleep. · Eat regularly and well. Long periods without food can trigger a headache. · Treat yourself to a massage. Some people find that massages are very helpful in relieving tension. · Try to keep your muscles relaxed by keeping good posture. Check your jaw, face, neck, and shoulder muscles for tension, and try to relax them. When sitting at a desk, change positions often, and stretch for 30 seconds each hour. · Reduce eyestrain from computers by blinking frequently and looking away from the computer screen every so often. Make sure you have proper eyewear and that your monitor is set up properly, about an arms length away. When should you call for help? Call 911 anytime you think you may need emergency care. For example, call if: 
· You have signs of a stroke. These may include: 
¨ Sudden numbness, paralysis, or weakness in your face, arm, or leg, especially on only one side of your body. ¨ Sudden vision changes. ¨ Sudden trouble speaking. ¨ Sudden confusion or trouble understanding simple statements. ¨ Sudden problems with walking or balance. ¨ A sudden, severe headache that is different from past headaches. Call your doctor now or seek immediate medical care if: 
· You have new or worse nausea and vomiting. · You have a new or higher fever. · Your headache gets much worse. Watch closely for changes in your health, and be sure to contact your doctor if: 
· You are not getting better after 2 days (48 hours). Where can you learn more? Go to http://robinson-pete.info/. Enter 82 17 07 in the search box to learn more about \"Tension Headache: Care Instructions. \" Current as of: October 14, 2016 Content Version: 11.3 © 0364-2898 [employer]. Care instructions adapted under license by [employer] (which disclaims liability or warranty for this information).  If you have questions about a medical condition or this instruction, always ask your healthcare professional. [place] [address] any warranty or liability for your use of this information. Neck: Exercises Your Care Instructions Here are some examples of typical rehabilitation exercises for your condition. Start each exercise slowly. Ease off the exercise if you start to have pain. Your doctor or physical therapist will tell you when you can start these exercises and which ones will work best for you. How to do the exercises Note: Stretching should make you feel a gentle stretch, but no pain. Stop any strengthening exercise that makes pain worse. Neck stretch 1. This stretch works best if you keep your shoulder down as you lean away from it. To help you remember to do this, start by relaxing your shoulders and lightly holding on to your thighs or your chair. 2. Tilt your head toward your shoulder and hold for 15 to 30 seconds. Let the weight of your head stretch your muscles. 3. If you would like a little added stretch, use your hand to gently and steadily pull your head toward your shoulder. For example, keeping your right shoulder down, lean your head to the left. 4. Repeat 2 to 4 times toward each shoulder. Diagonal neck stretch 1. Turn your head slightly toward the direction you will be stretching, and tilt your head diagonally toward your chest and hold for 15 to 30 seconds. 2. If you would like a little added stretch, use your hand to gently and steadily pull your head forward on the diagonal. 
3. Repeat 2 to 4 times toward each side. Dorsal glide stretch 1. Sit or stand tall and look straight ahead. 2. Slowly tuck your chin as you glide your head backward over your body 3. Hold for a count of 6, and then relax for up to 10 seconds. 4. Repeat 8 to 12 times. Note: The dorsal glide stretches the back of the neck. If you feel pain, do not glide so far back. Some people find this exercise easier to do while lying on their backs with an ice pack on the neck. Chest and shoulder stretch 1. Sit or stand tall and glide your head backward as in the dorsal glide stretch. 2. Raise both arms so that your hands are next to your ears. 3. Take a deep breath, and as you breathe out, lower your elbows down and behind your back. You will feel your shoulder blades slide down and together, and at the same time you will feel a stretch across your chest and the front of your shoulders. 4. Hold for about 6 seconds, and then relax for up to 10 seconds. 5. Repeat 8 to 12 times. Strengthening: Hands on head 1. Move your head backward, forward, and side to side against gentle pressure from your hands, holding each position for about 6 seconds. 2. Repeat 8 to 12 times. Follow-up care is a key part of your treatment and safety. Be sure to make and go to all appointments, and call your doctor if you are having problems. It's also a good idea to know your test results and keep a list of the medicines you take. Where can you learn more? Go to http://robinson-pete.info/. Enter P975 in the search box to learn more about \"Neck: Exercises. \" Current as of: March 21, 2017 Content Version: 11.3 © 8632-2273 [employer]. Care instructions adapted under license by [employer] (which disclaims liability or warranty for this information). If you have questions about a medical condition or this instruction, always ask your healthcare professional. [place] [address] any warranty or liability for your use of this information. Introducing [facility]! Vishal Weiss introduces [employer] patient portal. Now you can access parts of your medical record, email your doctor's office, and request medication refills online. 1. In your internet browser, go to [email or web address] 2. Click on the First Time User? Click Here link in the Sign In box. You will see the New Member Sign Up page. 3. Enter your mobifriends Access Code exactly as it appears below. You will not need to use this code after youve completed the sign-up process. If you do not sign up before the expiration date, you must request a new code. · mobifriends Access Code: [email or web address] Expires: 10/4/2017  3:46 PM 
 
4. Enter the last four digits of your Social Security Number (xxxx) and Date of Birth (mm/dd/yyyy) as indicated and click Submit. You will be taken to the next sign-up page. 5. Create a mobifriends ID. This will be your mobifriends login ID and cannot be changed, so think of one that is secure and easy to remember. 6. Create a mobifriends password. You can change your password at any time. 7. Enter your Password Reset Question and Answer. This can be used at a later time if you forget your password. 8. Enter your e-mail address. You will receive e-mail notification when new information is available in [address]. 9. Click Sign Up. You can now view and download portions of your medical record. 10. Click the Download Summary menu link to download a portable copy of your medical information. If you have questions, please visit the Frequently Asked Questions section of the mobifriends website. Remember, mobifriends is NOT to be used for urgent needs. For medical emergencies, dial 911. Now available from your iPhone and Android! Please provide this summary of care documentation to your next provider. Your primary care clinician is listed as Opal Butler. If you have any questions after today's visit, please call [phone].

## 2017-07-06 NOTE — PATIENT INSTRUCTIONS
Tension Headache: Care Instructions  Your Care Instructions  Most headaches are tension headaches. These headaches tend to happen again, especially if you are under stress. A tension headache may cause pain or a feeling of pressure all over your head. You probably can't pinpoint the center of the pain. If you keep getting tension headaches, the best thing you can do to limit them is to find out what is causing them and then make changes in those areas. Follow-up care is a key part of your treatment and safety. Be sure to make and go to all appointments, and call your doctor if you are having problems. Its also a good idea to know your test results and keep a list of the medicines you take. How can you care for yourself at home? · Rest in a quiet, dark room with a cool cloth on your forehead until your headache is gone. Close your eyes, and try to relax or go to sleep. Don't watch TV or read. Avoid using the computer. · Use a warm, moist towel or a heating pad set on low to relax tight shoulder and neck muscles. · Have someone gently massage your neck and shoulders. · Take pain medicines exactly as directed. ¨ If the doctor gave you a prescription medicine for pain, take it as prescribed. ¨ If you are not taking a prescription pain medicine, ask your doctor if you can take an over-the-counter medicine. · Be careful not to take pain medicine more often than the instructions allow, because you may get worse or more frequent headaches when the medicine wears off. · If you get another tension headache, stop what you are doing and sit quietly for a moment. Close your eyes and breathe slowly. Try to relax your head and neck muscles. · Do not ignore new symptoms that occur with a headache, such as fever, weakness or numbness, vision changes, or confusion. These may be signs of a more serious problem. To help prevent headaches  · Keep a headache diary so you can figure out what triggers your headaches. Avoiding triggers may help you prevent headaches. Record when each headache began, how long it lasted, and what the pain was like (throbbing, aching, stabbing, or dull). List anything that may have triggered the headache, such as being physically or emotionally stressed or being anxious or depressed. Other possible triggers are hunger, anger, fatigue, poor posture, and muscle strain. · Find healthy ways to deal with stress. Headaches are most common during or right after stressful times. Take time to relax before and after you do something that has caused a headache in the past.  · Exercise daily to relieve stress. Relaxation exercises may help reduce tension. · Get plenty of sleep. · Eat regularly and well. Long periods without food can trigger a headache. · Treat yourself to a massage. Some people find that massages are very helpful in relieving tension. · Try to keep your muscles relaxed by keeping good posture. Check your jaw, face, neck, and shoulder muscles for tension, and try to relax them. When sitting at a desk, change positions often, and stretch for 30 seconds each hour. · Reduce eyestrain from computers by blinking frequently and looking away from the computer screen every so often. Make sure you have proper eyewear and that your monitor is set up properly, about an arms length away. When should you call for help? Call 911 anytime you think you may need emergency care. For example, call if:  · You have signs of a stroke. These may include:  ¨ Sudden numbness, paralysis, or weakness in your face, arm, or leg, especially on only one side of your body. ¨ Sudden vision changes. ¨ Sudden trouble speaking. ¨ Sudden confusion or trouble understanding simple statements. ¨ Sudden problems with walking or balance. ¨ A sudden, severe headache that is different from past headaches. Call your doctor now or seek immediate medical care if:  · You have new or worse nausea and vomiting.   · You have a new or higher fever. · Your headache gets much worse. Watch closely for changes in your health, and be sure to contact your doctor if:  · You are not getting better after 2 days (48 hours). Where can you learn more? Go to http://robinson-pete.info/. Enter 84 17 72 in the search box to learn more about \"Tension Headache: Care Instructions. \"  Current as of: October 14, 2016  Content Version: 11.3  © 2541-4843 [employer]. Care instructions adapted under license by [employer] (which disclaims liability or warranty for this information). If you have questions about a medical condition or this instruction, always ask your healthcare professional. [place] [address] any warranty or liability for your use of this information. Neck: Exercises  Your Care Instructions  Here are some examples of typical rehabilitation exercises for your condition. Start each exercise slowly. Ease off the exercise if you start to have pain. Your doctor or physical therapist will tell you when you can start these exercises and which ones will work best for you. How to do the exercises  Note: Stretching should make you feel a gentle stretch, but no pain. Stop any strengthening exercise that makes pain worse. Neck stretch    1. This stretch works best if you keep your shoulder down as you lean away from it. To help you remember to do this, start by relaxing your shoulders and lightly holding on to your thighs or your chair. 2. Tilt your head toward your shoulder and hold for 15 to 30 seconds. Let the weight of your head stretch your muscles. 3. If you would like a little added stretch, use your hand to gently and steadily pull your head toward your shoulder. For example, keeping your right shoulder down, lean your head to the left. 4. Repeat 2 to 4 times toward each shoulder. Diagonal neck stretch    1.  Turn your head slightly toward the direction you will be stretching, and tilt your head diagonally toward your chest and hold for 15 to 30 seconds. 2. If you would like a little added stretch, use your hand to gently and steadily pull your head forward on the diagonal.  3. Repeat 2 to 4 times toward each side. Dorsal glide stretch    1. Sit or stand tall and look straight ahead. 2. Slowly tuck your chin as you glide your head backward over your body  3. Hold for a count of 6, and then relax for up to 10 seconds. 4. Repeat 8 to 12 times. Note: The dorsal glide stretches the back of the neck. If you feel pain, do not glide so far back. Some people find this exercise easier to do while lying on their backs with an ice pack on the neck. Chest and shoulder stretch    1. Sit or stand tall and glide your head backward as in the dorsal glide stretch. 2. Raise both arms so that your hands are next to your ears. 3. Take a deep breath, and as you breathe out, lower your elbows down and behind your back. You will feel your shoulder blades slide down and together, and at the same time you will feel a stretch across your chest and the front of your shoulders. 4. Hold for about 6 seconds, and then relax for up to 10 seconds. 5. Repeat 8 to 12 times. Strengthening: Hands on head    1. Move your head backward, forward, and side to side against gentle pressure from your hands, holding each position for about 6 seconds. 2. Repeat 8 to 12 times. Follow-up care is a key part of your treatment and safety. Be sure to make and go to all appointments, and call your doctor if you are having problems. It's also a good idea to know your test results and keep a list of the medicines you take. Where can you learn more? Go to http://robinson-pete.info/. Enter P975 in the search box to learn more about \"Neck: Exercises. \"  Current as of: March 21, 2017  Content Version: 11.3  © 5291-9646 [employer].  Care instructions adapted under license by [employer] (which disclaims liability or warranty for this information). If you have questions about a medical condition or this instruction, always ask your healthcare professional. [address] any warranty or liability for your use of this information.

## 2017-07-06 NOTE — PROGRESS NOTES
SUBJECTIVE  Chief Complaint   Patient presents with    Headache    Shoulder Pain     bilateral;      The patient presents complaining of an intermittent history of headaches. Headache is typically located either between her eyes or in the neck / occpital region. The pain is described as tightness or sharp. The headache typically last a few hours and can occur several times per week. The patient denies associated nausea. The patient denies any blurred vision or double vision. The patient does not have an aura. There is no associated phonophobia. There is no associated photophobia. The patient denies any numbness, tingling or weakness. The patient reports they have had a history of headaches in the past.  The patient has tried allergy remedies in the past with some relief. She says her shoulders hurt and are tight, pointing to her trapezius. OBJECTIVE    Blood pressure 104/76, pulse 86, temperature 98.8 °F (37.1 °C), temperature source Oral, resp. rate 14, height 5' 8\" (1.727 m), weight 164 lb (74.4 kg), SpO2 96 %. General:  alert, cooperative, well appearing, in no apparent distress. Head:  Head is symmetric, normocephalic without evidence of trauma or deformity. Eyes:  Pupils are equally round and reactive to light with accommodation. The extra-ocular movements are intact. The lids are without swelling, lesions, or drainage. The conjunctiva is clear and noninjected. CV:  The heart sounds are regular in rate and rhythm. There is a normal S1 and S2. There or no murmurs. Lungs: Inspiratory and expiratory efforts are full and unlabored. Lung sounds are clear and equal to auscultation throughout all lung fields without wheezing, rales, or rhonchi. Neurological:  Cranial nerves II-XII are intact. There is no sensory or motor deficits. Gait is normal.    Neck:  Full ROM. Nontender midline. She has tender triggers of trapezius on bilateral sides of her neck.    Shoulders:  Full painfree ROM.    Skin:  No rashes, no jaundice. ASSESSMENT / PLAN    ICD-10-CM ICD-9-CM    1. Episodic tension-type headache, not intractable G44.219 339.11 MRI BRAIN WO CONT   2. Trapezius muscle strain, unspecified laterality, initial encounter R26.500X 840.8 REFERRAL TO PHYSICAL THERAPY     Sounds like tension HAs. Due to recurrent nature and since her  has currently been diagnosed with a brain tumor, she is keen on an MRI. I understand that and will proceed. Refer to PT for modalities and treatment of trapezius strain / myofascial pain and tension HAs. All chart history elements were reviewed by me at the time of the visit even though marked at time of note closure. Patient understands our medical plan. Patient has provided input and agrees with goals. Alternatives have been explained and offered. All questions answered. The patient is to call if condition worsens or fails to improve. Follow-up Disposition:  Return in about 1 month (around 8/6/2017) for shoulder pain.

## 2017-07-13 ENCOUNTER — HOSPITAL ENCOUNTER (OUTPATIENT)
Age: 47
Discharge: HOME | End: 2017-07-13
Attending: FAMILY MEDICINE
Payer: OTHER GOVERNMENT

## 2017-07-13 ENCOUNTER — HOSPITAL ENCOUNTER (OUTPATIENT)
Dept: PHYSICAL THERAPY | Age: 47
Discharge: HOME | End: 2017-07-13
Payer: OTHER GOVERNMENT

## 2017-07-13 DIAGNOSIS — S46.819A TRAPEZIUS MUSCLE STRAIN, UNSPECIFIED LATERALITY, INITIAL ENCOUNTER: ICD-10-CM

## 2017-07-13 DIAGNOSIS — G44.219 EPISODIC TENSION-TYPE HEADACHE, NOT INTRACTABLE: ICD-10-CM

## 2017-07-13 PROCEDURE — 97162 PT EVAL MOD COMPLEX 30 MIN: CPT

## 2017-07-13 PROCEDURE — 97110 THERAPEUTIC EXERCISES: CPT
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
## 2017-07-13 NOTE — PROGRESS NOTES
PT DAILY TREATMENT NOTE - [facility]     Patient Name: Ariana Perez  Date:2017  : 1970  [x]  Patient  Verified  Payor:  / Plan: [facility]  RETIREES AND DEPENDENTS / Product Type:  /    In time:1:32  Out time:2:18  Total Treatment Time (min): 46  1:1 Treatment Time (min): 46   Visit #: 1 of 10    Treatment Area: Trapezius muscle strain, unspecified laterality, initial encounter [K51.252V]    SUBJECTIVE  Pain Level (0-10 scale): 6  Any medication changes, allergies to medications, adverse drug reactions, diagnosis change, or new procedure performed?: [x] No    [] Yes (see summary sheet for update)  Subjective functional status/changes:   [] No changes reported  See POC    OBJECTIVE    36 min [x]Eval                  []Re-Eval     10 min Therapeutic Exercise:  [] See flow sheet : HEP instruction and demonstration, pt education regarding anatomy and physiology of the neck, scapular region, and upper/mid back and how it relates to the pt's condition. Rationale: increase ROM and increase strength to improve the patients ability to tolerate ADLs          With   [] TE   [] TA   [] neuro   [] other: Patient Education: [x] Review HEP    [] Progressed/Changed HEP based on:   [] positioning   [] body mechanics   [] transfers   [] heat/ice application    [] other:      Other Objective/Functional Measures: See evaluation. Pain Level (0-10 scale) post treatment: 6    ASSESSMENT/Changes in Function: Pt given HEP handout to perform. Pt understood exercises in HEP handout. Pt denies numbness/tingling. Pt demonstrated decreased AROM, decreased strength, increased muscle tightness, tenderness to palpation, and decreased t/s and c/s mobility. Pt would benefit from physical therapy to improve the above impairments to help the pt return to performing ADLs, functional and care taking activities.      Patient will continue to benefit from skilled PT services to modify and progress therapeutic interventions, address functional mobility deficits, address ROM deficits, address strength deficits, analyze and address soft tissue restrictions, analyze and cue movement patterns, analyze and modify body mechanics/ergonomics and assess and modify postural abnormalities to attain remaining goals. [x]  See Plan of Care  []  See progress note/recertification  []  See Discharge Summary         Progress towards goals / Updated goals:  Short Term Goals: To be accomplished in 2 weeks:  1. Pt will report compliance and independence to HEP to help the pt manage their pain and symptoms. Eval: established                      Long Term Goals: To be accomplished in 5 weeks:  1. Pt will increase FOTO score to 66 points to improve ability to perform ADLs. Eval: 55  2. Pt will report a decrease in at worst pain to 6/10 in the neck and upper/mid back region to improve ability to carry groceries. Eval: 9/10 at worst  3. Pt will increase AROM c/s EXT to 45 degs, right SB to 30 degs, right rotation to WNL to improve ability to perform care taking abilities. Eval: EXT 40 degs with tightness on right neck, right SB 24 degs, right rotation 59 degs  4. Pt will report being able to drive for 5 hours with minimal to no increased pain to improve driving ability and tolerance. Eval: reports having increased neck/upper and mid back pain after 4 hours in the car and states that she drives \"at least 5 hours frequently\".      PLAN  [x]  Upgrade activities as tolerated     [x]  Continue plan of care  [x]  Update interventions per flow sheet       []  Discharge due to:_  []  Other:_      Tyrone Mckeon PT 7/13/2017  1:50 PM    Future Appointments  Date Time Provider Ramez Villalobos   7/13/2017 1:30 PM Tyrone Mckeon PT [place] [facility]   7/13/2017 8:00 PM [facility] MRI RM 2 [facility]

## 2017-07-17 ENCOUNTER — HOSPITAL ENCOUNTER (OUTPATIENT)
Dept: PHYSICAL THERAPY | Age: 47
Discharge: HOME | End: 2017-07-17
Payer: OTHER GOVERNMENT

## 2017-07-17 PROCEDURE — 97140 MANUAL THERAPY 1/> REGIONS: CPT

## 2017-07-17 PROCEDURE — 97110 THERAPEUTIC EXERCISES: CPT

## 2017-07-17 NOTE — PROGRESS NOTES
PT DAILY TREATMENT NOTE     Patient Name: Julisa Roblero  Date:2017  : 1970  [x]  Patient  Verified  Payor:  / Plan: [facility]  RETIREES AND DEPENDENTS / Product Type:  /    In time: 2:15  Out time: 3:15  Total Treatment Time (min): 60 (1:1 30min)   Visit #: 2 of 10    Treatment Area: Strain of other muscles, fascia and tendons at shoulder and upper arm level, unspecified arm, initial encounter [S43.969E]    SUBJECTIVE  Pain Level (0-10 scale): 5-610   Any medication changes, allergies to medications, adverse drug reactions, diagnosis change, or new procedure performed?: [x] No    [] Yes (see summary sheet for update)  Subjective functional status/changes:   [] No changes reported  Patient reported no change following the evaluation. Patient stated she did some of the HEP and didn't have any questions or concerns. OBJECTIVE    Modality rationale: decrease pain to improve the patients ability to perform ADLs.     Min Type Additional Details    [] Estim:  []Unatt       []IFC  []Premod                        []Other:  []w/ice   []w/heat  Position:  Location:    [] Estim: []Att    []TENS instruct  []NMES                    []Other:  []w/US   []w/ice   []w/heat  Position:  Location:    []  Traction: [] Cervical       []Lumbar                       [] Prone          []Supine                       []Intermittent   []Continuous Lbs:  [] before manual  [] after manual    []  Ultrasound: []Continuous   [] Pulsed                           []1MHz   []3MHz W/cm2:  Location:    []  Iontophoresis with dexamethasone         Location: [] Take home patch   [] In clinic   10 []  Ice     [x]  heat  []  Ice massage  []  Laser   []  Anodyne Position: prone  Location: cervical/thoracic region     []  Laser with stim  []  Other:  Position:  Location:    []  Vasopneumatic Device Pressure:       [] lo [] med [] hi   Temperature: [] lo [] med [] hi   [] Skin assessment post-treatment:  []intact []redness- no adverse reaction    []redness - adverse reaction:     40 min Therapeutic Exercise:  [x] See flow sheet :   Rationale: increase ROM and increase strength to improve the patients ability to perform ADLs. 10 min Manual Therapy:  STM to (B) UT/Levator scap/cervical paraspinals; (R) thoracic paraspinals. Rationale: decrease pain, increase ROM and increase tissue extensibility to increase ease with ADLs. With   [] TE   [] TA   [] neuro   [] other: Patient Education: [x] Review HEP    [] Progressed/Changed HEP based on:   [] positioning   [] body mechanics   [] transfers   [] heat/ice application    [] other:      Other Objective/Functional Measures:   Initiated therEx per POC. Pain Level (0-10 scale) post treatment: 4/10   ASSESSMENT/Changes in Function: Patient reported no increase in pain with manual. Tightness/tenderness noted at (R) thoracic paraspinals. Patient reported feeling better following the MHP. Patient denied any numbness at cervical/thoracic region or down the arms. Patient reported a slight decrease in pain at end of the session. Patient will continue to benefit from skilled PT services to modify and progress therapeutic interventions, address functional mobility deficits, address ROM deficits, address strength deficits, analyze and address soft tissue restrictions, analyze and cue movement patterns and assess and modify postural abnormalities to attain remaining goals. []  See Plan of Care  []  See progress note/recertification  []  See Discharge Summary         Progress towards goals / Updated goals:  Short Term Goals: To be accomplished in 2 weeks:  1. Pt will report compliance and independence to HEP to help the pt manage their pain and symptoms.   Eval: established                      Long Term Goals: To be accomplished in 5 weeks:  1. Pt will increase FOTO score to 66 points to improve ability to perform ADLs. Eval: 55  2.  Pt will report a decrease in at worst pain to 6/10 in the neck and upper/mid back region to improve ability to carry groceries. Eval: 9/10 at worst  3. Pt will increase AROM c/s EXT to 45 degs, right SB to 30 degs, right rotation to [facility] improve ability to perform care taking abilities. Eval: EXT 40 degs with tightness on right neck, right SB 24 degs, right rotation 59 degs  4. Pt will report being able to drive for 5 hours with minimal to no increased pain to improve driving ability and tolerance. Eval: reports having increased neck/upper and mid back pain after 4 hours in the car and states that she drives \"at least 5 hours frequently\".        PLAN  []  Upgrade activities as tolerated     [x]  Continue plan of care  []  Update interventions per flow sheet       []  Discharge due to:_  []  Other:_      Gabino Parker, PT 7/17/2017  2:29 PM    No future appointments.

## 2017-07-31 ENCOUNTER — HOSPITAL ENCOUNTER (OUTPATIENT)
Dept: PHYSICAL THERAPY | Age: 47
End: 2017-07-31
Payer: OTHER GOVERNMENT

## 2017-08-02 ENCOUNTER — HOSPITAL ENCOUNTER (OUTPATIENT)
Dept: PHYSICAL THERAPY | Age: 47
Discharge: HOME | End: 2017-08-02
Payer: OTHER GOVERNMENT

## 2017-08-02 PROCEDURE — 97110 THERAPEUTIC EXERCISES: CPT

## 2017-08-02 PROCEDURE — 97140 MANUAL THERAPY 1/> REGIONS: CPT

## 2017-08-02 NOTE — PROGRESS NOTES
PT DAILY TREATMENT NOTE     Patient Name: Ld Dahl  Date:2017  : 1970  [x]  Patient  Verified  Payor:  / Plan: [facility]  RETIREES AND DEPENDENTS / Product Type:  /    In time: 3:04  Out time: 4:09  Total Treatment Time (min): 50 (pt on phone for 15 mins)  1:1 treatment time (min): 50   Visit #: 3 of 10    Treatment Area: Strain of other muscles, fascia and tendons at shoulder and upper arm level, unspecified arm, initial encounter [S46.878U]    SUBJECTIVE  Pain Level (0-10 scale): 4   Any medication changes, allergies to medications, adverse drug reactions, diagnosis change, or new procedure performed?: [x] No    [] Yes (see summary sheet for update)  Subjective functional status/changes:   [] No changes reported  \"I have a lot of stuff going on at home. But I have been walking more and my neck pain is not as bad\". Pt reports she is expecting an important phone call and may have to answer it if the phone rings. OBJECTIVE    Modality rationale: decrease pain and increase tissue extensibility to improve the patients ability to perform ADLs.     Min Type Additional Details    [] Estim:  []Unatt       []IFC  []Premod                        []Other:  []w/ice   []w/heat  Position:  Location:    [] Estim: []Att    []TENS instruct  []NMES                    []Other:  []w/US   []w/ice   []w/heat  Position:  Location:    []  Traction: [] Cervical       []Lumbar                       [] Prone          []Supine                       []Intermittent   []Continuous Lbs:  [] before manual  [] after manual    []  Ultrasound: []Continuous   [] Pulsed                           []1MHz   []3MHz W/cm2:  Location:    []  Iontophoresis with dexamethasone         Location: [] Take home patch   [] In clinic   10 []  Ice     [x]  heat  []  Ice massage  []  Laser   []  Anodyne Position: prone  Location: neck and upper/mid back region region     []  Laser with stim  []  Other: Position:  Location:    []  Vasopneumatic Device Pressure:       [] lo [] med [] hi   Temperature: [] lo [] med [] hi   [] Skin assessment post-treatment:  []intact []redness- no adverse reaction    []redness - adverse reaction:     28 min Therapeutic Exercise:  [x] See flow sheet :   Rationale: increase ROM and increase strength to improve the patients ability to perform ADLs. 12 min Manual Therapy: STM/DTM B UT/LS and B c/s and t/s paraspinals; SOR, manual  C/s distraction gentle   Rationale: decrease pain, increase ROM and increase tissue extensibility to increase ease with ADLs. With   [] TE   [] TA   [] neuro   [] other: Patient Education: [x] Review HEP    [] Progressed/Changed HEP based on:   [] positioning   [] body mechanics   [] transfers   [] heat/ice application    [] other:      Other Objective/Functional Measures: Held some exercises today secondary to pt needing to take a phone call for personal reasons. Pain Level (0-10 scale) post treatment: 3    ASSESSMENT/Changes in Function: Tightness noted on the left>right UT/LS region today. Reported decreased pain post session. Educated pt on postural awareness and importance of performing HEP exercises at home. Continue POC as tolerated. Patient will continue to benefit from skilled PT services to modify and progress therapeutic interventions, address functional mobility deficits, address ROM deficits, address strength deficits, analyze and address soft tissue restrictions, analyze and cue movement patterns and assess and modify postural abnormalities to attain remaining goals. []  See Plan of Care  []  See progress note/recertification  []  See Discharge Summary         Progress towards goals / Updated goals:  Short Term Goals: To be accomplished in 2 weeks:  1.  Pt will report compliance and independence to HEP to help the pt manage their pain and symptoms.   Eval: established              Current: Reports she has trouble performing her HEP at home because she has \"no time\" because of other obligations 8/2/2017           Long Term Goals: To be accomplished in 5 weeks:  1. Pt will increase FOTO score to 66 points to improve ability to perform ADLs. Eval: 55  2. Pt will report a decrease in at worst pain to 6/10 in the neck and upper/mid back region to improve ability to carry groceries. Eval: 9/10 at worst  3. Pt will increase AROM c/s EXT to 45 degs, right SB to 30 degs, right rotation to [facility] improve ability to perform care taking abilities. Eval: EXT 40 degs with tightness on right neck, right SB 24 degs, right rotation 59 degs  4. Pt will report being able to drive for 5 hours with minimal to no increased pain to improve driving ability and tolerance. Eval: reports having increased neck/upper and mid back pain after 4 hours in the car and states that she drives \"at least 5 hours frequently\".      PLAN  [x]  Upgrade activities as tolerated     [x]  Continue plan of care  [x]  Update interventions per flow sheet       []  Discharge due to:_  []  Other:_      Candace Camp, PT 8/2/2017  3:30 PM    No future appointments.

## 2017-08-07 ENCOUNTER — HOSPITAL ENCOUNTER (OUTPATIENT)
Dept: PHYSICAL THERAPY | Age: 47
Discharge: HOME | End: 2017-08-07
Payer: OTHER GOVERNMENT

## 2017-08-07 PROCEDURE — 97140 MANUAL THERAPY 1/> REGIONS: CPT

## 2017-08-07 PROCEDURE — 97110 THERAPEUTIC EXERCISES: CPT

## 2017-08-07 NOTE — PROGRESS NOTES
PT DAILY TREATMENT NOTE     Patient Name: Amarilys Garcia  Date:2017  : 1970  [x]  Patient  Verified  Payor:  / Plan: [facility]  RETIREES AND DEPENDENTS / Product Type: [place]eron /    In time:12:38  Out time:1:35  Total Treatment Time (min): 62  Visit #: 4 of 10    Treatment Area: Strain of other muscles, fascia and tendons at shoulder and upper arm level, unspecified arm, initial encounter [H55.576X]    SUBJECTIVE  Pain Level (0-10 scale): 4/10  Any medication changes, allergies to medications, adverse drug reactions, diagnosis change, or new procedure performed?: [x] No    [] Yes (see summary sheet for update)  Subjective functional status/changes:   [] No changes reported  Patient stated sometimes her neck feels better and other days it is worse. Patient stated evenings are worst.     OBJECTIVE    Modality rationale: decrease pain to improve the patients ability to perform ADLs.     Min Type Additional Details    [] Estim:  []Unatt       []IFC  []Premod                        []Other:  []w/ice   []w/heat  Position:  Location:    [] Estim: []Att    []TENS instruct  []NMES                    []Other:  []w/US   []w/ice   []w/heat  Position:  Location:    []  Traction: [] Cervical       []Lumbar                       [] Prone          []Supine                       []Intermittent   []Continuous Lbs:  [] before manual  [] after manual    []  Ultrasound: []Continuous   [] Pulsed                           []1MHz   []3MHz W/cm2:  Location:    []  Iontophoresis with dexamethasone         Location: [] Take home patch   [] In clinic   10 []  Ice     [x]  heat  []  Ice massage  []  Laser   []  Anodyne Position: prone  Location: thoracic spine    []  Laser with stim  []  Other:  Position:  Location:    []  Vasopneumatic Device Pressure:       [] lo [] med [] hi   Temperature: [] lo [] med [] hi   [x] Skin assessment post-treatment:  [x]intact []redness- no adverse reaction    []redness - adverse reaction:     *37 min Therapeutic Exercise:  [x] See flow sheet :   Rationale: increase ROM and increase strength to improve the patients ability to perform ADLs. 10 min Manual Therapy:  STM to (B) UT and along vertebral border of scap   Rationale: decrease pain, increase ROM and increase tissue extensibility to increase ease with ADLs. With   [] TE   [] TA   [] neuro   [] other: Patient Education: [x] Review HEP    [] Progressed/Changed HEP based on:   [] positioning   [] body mechanics   [] transfers   [] heat/ice application    [] other:      Other Objective/Functional Measures:   Tenderness/tightness noted along vertebral border of scap and trigger point noted in (R) UT. Cervical AROM; Ext: 49deg; SB:(B) 38deg  ROT: (R) 63(L) 80deg  Sensation assessed at mid back  and was intact prior to application of MHP. Pain Level (0-10 scale) post treatment: 3/10     ASSESSMENT/Changes in Function: Patient stated manual helped to loosen up the area more. Patient demonstrated improvement in cervical AROM since [facility], but continues to be limited with cervical rotation to the right. Patient reported no increase in pain with wall push ups, but could feel the muscle working in her mid back. Patient reported reduced pain at the end of the session. Patient will continue to benefit from skilled PT services to modify and progress therapeutic interventions, address functional mobility deficits, address ROM deficits, address strength deficits, analyze and address soft tissue restrictions, analyze and cue movement patterns and assess and modify postural abnormalities to attain remaining goals. []  See Plan of Care  []  See progress note/recertification  []  See Discharge Summary         Progress towards goals / Updated goals:  Short Term Goals: To be accomplished in 2 weeks:  1.  Pt will report compliance and independence to HEP to help the pt manage their pain and symptoms.   Eval: established              Current: Reports she has trouble performing her HEP at home because she has \"no time\" because of other obligations 8/2/2017           Long Term Goals: To be accomplished in 5 weeks:  1. Pt will increase FOTO score to 66 points to improve ability to perform ADLs. Eval: 55  2. Pt will report a decrease in at worst pain to 6/10 in the neck and upper/mid back region to improve ability to carry groceries. Eval: 9/10 at worst  Current: Patient stated her pain at worst is a 6-7/10.   3. Pt will increase AROM c/s EXT to 45 degs, right SB to 30 degs, right rotation to [facility] improve ability to perform care taking abilities. Eval: EXT 40 degs with tightness on right neck, right SB 24 degs, right rotation 59 degs  Cervical AROM; Ext: 49deg; SB:(B) 38deg  ROT: (R) 63(L) 80deg  4. Pt will report being able to drive for 5 hours with minimal to no increased pain to improve driving ability and tolerance. Eval: reports having increased neck/upper and mid back pain after 4 hours in the car and states that she drives \"at least 5 hours frequently\".      PLAN  []  Upgrade activities as tolerated     [x]  Continue plan of care  []  Update interventions per flow sheet       []  Discharge due to:_  []  Other:_      Deborha Litten, PT 8/7/2017  12:47 PM    Future Appointments  Date Time Provider Ramez Villalobos   8/11/2017 1:00 PM Shelbie West, PT [place] [facility]

## 2017-08-10 ENCOUNTER — HOSPITAL ENCOUNTER (OUTPATIENT)
Dept: PHYSICAL THERAPY | Age: 47
End: 2017-08-10
Payer: OTHER GOVERNMENT

## 2017-08-11 ENCOUNTER — APPOINTMENT (OUTPATIENT)
Dept: PHYSICAL THERAPY | Age: 47
End: 2017-08-11
Payer: OTHER GOVERNMENT

## 2017-08-11 ENCOUNTER — HOSPITAL ENCOUNTER (OUTPATIENT)
Dept: PHYSICAL THERAPY | Age: 47
Discharge: HOME | End: 2017-08-11
Payer: OTHER GOVERNMENT

## 2017-08-11 PROCEDURE — 97140 MANUAL THERAPY 1/> REGIONS: CPT

## 2017-08-11 PROCEDURE — 97110 THERAPEUTIC EXERCISES: CPT

## 2017-08-11 NOTE — PROGRESS NOTES
PT DAILY TREATMENT NOTE     Patient Name: Shraddha Crawford  Date:2017  : 1970  [x]  Patient  Verified  Payor:  / Plan: [facility]  RETIREES AND DEPENDENTS / Product Type:  /    In time: 10:44   Out time: 11:51  Total Treatment Time (min): 67  1:1 Treatment time (min): 33  Visit #: 5 of 10    Treatment Area: Strain of other muscles, fascia and tendons at shoulder and upper arm level, unspecified arm, initial encounter [S41.618K]    SUBJECTIVE  Pain Level (0-10 scale): 5  Any medication changes, allergies to medications, adverse drug reactions, diagnosis change, or new procedure performed?: [x] No    [] Yes (see summary sheet for update)  Subjective functional status/changes:   [] No changes reported  \"I have good days and bad days\"    OBJECTIVE    Modality rationale: decrease pain to improve the patients ability to perform ADLs.     Min Type Additional Details    [] Estim:  []Unatt       []IFC  []Premod                        []Other:  []w/ice   []w/heat  Position:  Location:    [] Estim: []Att    []TENS instruct  []NMES                    []Other:  []w/US   []w/ice   []w/heat  Position:  Location:    []  Traction: [] Cervical       []Lumbar                       [] Prone          []Supine                       []Intermittent   []Continuous Lbs:  [] before manual  [] after manual    []  Ultrasound: []Continuous   [] Pulsed                           []1MHz   []3MHz W/cm2:  Location:    []  Iontophoresis with dexamethasone         Location: [] Take home patch   [] In clinic   10 []  Ice     [x]  heat  []  Ice massage  []  Laser   []  Anodyne Position: prone  Location: thoracic spine    []  Laser with stim  []  Other:  Position:  Location:    []  Vasopneumatic Device Pressure:       [] lo [] med [] hi   Temperature: [] lo [] med [] hi   [x] Skin assessment post-treatment:  [x]intact []redness- no adverse reaction    []redness - adverse reaction:     42 min Therapeutic Exercise: [x] See flow sheet : exercises and goal reassessment   Rationale: increase ROM and increase strength to improve the patients ability to perform ADLs. 15 min Manual Therapy:  STM B UT, TPR right rhomboid and medial border of right scapula   Rationale: decrease pain, increase ROM and increase tissue extensibility to increase ease with ADLs. With   [] TE   [] TA   [] neuro   [] other: Patient Education: [x] Review HEP    [] Progressed/Changed HEP based on:   [] positioning   [] body mechanics   [] transfers   [] heat/ice application    [] other:      Other Objective/Functional Measures: See goals below. Pain       Best: 3/10     Worst: 6/10  Continues to have limited t/s mobility. Pain Level (0-10 scale) post treatment: 2-3    ASSESSMENT/Changes in Function: See PN. Pt has demonstrated improvements in overall pain and ROM since starting therapy. Pt missed ~ 2 weeks of therapy secondary to personal issues/other obligations. Educated pt on importance of compliance to therapy 2x/week to help improve her symptoms. We will plan on continuing therapy at this time to further improve her symptoms to help perform functional tasks with more ease. Patient will continue to benefit from skilled PT services to modify and progress therapeutic interventions, address functional mobility deficits, address ROM deficits, address strength deficits, analyze and address soft tissue restrictions, analyze and cue movement patterns and assess and modify postural abnormalities to attain remaining goals. []  See Plan of Care  [x]  See progress note/recertification  []  See Discharge Summary         Progress towards goals / Updated goals:  Short Term Goals: To be accomplished in 2 weeks:  1. Pt will report compliance and independence to HEP to help the pt manage their pain and symptoms.   Eval: established              Current: partial compliance 8/11/2017           Long Term Goals: To be accomplished in 5 weeks:  1. Pt will increase FOTO score to 66 points to improve ability to perform ADLs. Eval: 55  Current: regression, 49 points 8/11/2017  2. Pt will report a decrease in at worst pain to 6/10 in the neck and upper/mid back region to improve ability to carry groceries. Eval: 9/10 at worst  Current: MET 6/10 at worst  3. Pt will increase AROM c/s EXT to 45 degs, right SB to 30 degs, right rotation to [facility] improve ability to perform care taking abilities. Eval: EXT 40 degs with tightness on right neck, right SB 24 degs, right rotation 59 degs  Current: MET EXT 47 degs, left SB 36 degs, right SB 38 degs, B rotation WN 8/11/2017  4. Pt will report being able to drive for 5 hours with minimal to no increased pain to improve driving ability and tolerance. Eval: reports having increased neck/upper and mid back pain after 4 hours in the car and states that she drives \"at least 5 hours frequently\".    Current: reports she had not tried long distance driving since eval and will have to drive long distances starting in September 8/11/2017    PLAN  [x]  Upgrade activities as tolerated     [x]  Continue plan of care  [x]  Update interventions per flow sheet       []  Discharge due to:_  []  Other:_      Sven Keene, PT 8/11/2017  10:47 AM    Future Appointments  Date Time Provider Ramez Villalobos   8/11/2017 10:30 AM Sven Keene, PT [facility]
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
## 2017-08-14 ENCOUNTER — HOSPITAL ENCOUNTER (OUTPATIENT)
Dept: PHYSICAL THERAPY | Age: 47
Discharge: HOME | End: 2017-08-14
Payer: OTHER GOVERNMENT

## 2017-08-14 PROCEDURE — 97110 THERAPEUTIC EXERCISES: CPT

## 2017-08-14 PROCEDURE — 97140 MANUAL THERAPY 1/> REGIONS: CPT

## 2017-08-14 NOTE — PROGRESS NOTES
PT DAILY TREATMENT NOTE     Patient Name: Ld Dahl  Date:2017  : 1970  [x]  Patient  Verified  Payor:  / Plan: [facility]  RETIREES AND DEPENDENTS / Product Type:  /    In time: 9:09   Out time: 9:59  Total Treatment Time (min): 50  1:1 Treatment time (min): 30  Visit #: 1 of 8    Treatment Area: Strain of other muscles, fascia and tendons at shoulder and upper arm level, unspecified arm, initial encounter [S45.219A]    SUBJECTIVE  Pain Level (0-10 scale): 3  Any medication changes, allergies to medications, adverse drug reactions, diagnosis change, or new procedure performed?: [x] No    [] Yes (see summary sheet for update)  Subjective functional status/changes:   [] No changes reported  \"It is OK today. Normal over the weekend\"    OBJECTIVE    Modality rationale: decrease pain to improve the patients ability to perform ADLs.     Min Type Additional Details    [] Estim:  []Unatt       []IFC  []Premod                        []Other:  []w/ice   []w/heat  Position:  Location:    [] Estim: []Att    []TENS instruct  []NMES                    []Other:  []w/US   []w/ice   []w/heat  Position:  Location:    []  Traction: [] Cervical       []Lumbar                       [] Prone          []Supine                       []Intermittent   []Continuous Lbs:  [] before manual  [] after manual    []  Ultrasound: []Continuous   [] Pulsed                           []1MHz   []3MHz W/cm2:  Location:    []  Iontophoresis with dexamethasone         Location: [] Take home patch   [] In clinic   10 []  Ice     [x]  heat  []  Ice massage  []  Laser   []  Anodyne Position: prone  Location: thoracic spine    []  Laser with stim  []  Other:  Position:  Location:    []  Vasopneumatic Device Pressure:       [] lo [] med [] hi   Temperature: [] lo [] med [] hi   [x] Skin assessment post-treatment:  [x]intact []redness- no adverse reaction    []redness - adverse reaction:     30 min Therapeutic Exercise:  [x] See flow sheet :   Rationale: increase ROM and increase strength to improve the patients ability to perform ADLs. 10 min Manual Therapy:  TPR right rhomboid, t/s mobes grade 2   Rationale: decrease pain, increase ROM and increase tissue extensibility to increase ease with ADLs. With   [] TE   [] TA   [] neuro   [] other: Patient Education: [x] Review HEP    [] Progressed/Changed HEP based on:   [] positioning   [] body mechanics   [] transfers   [] heat/ice application    [] other:      Other Objective/Functional Measures: Added 1/2 prone letters, s/l B shoulder ABD, increased tband rows/EXT/Ys to green tband to improve scapular strength for posture. Pain Level (0-10 scale) post treatment: 2    ASSESSMENT/Changes in Function: Mild decrease in pain reported post session. Decreased mid>upper t/s mobility noted during manual interventions. Continue POC as tolerated. Patient will continue to benefit from skilled PT services to modify and progress therapeutic interventions, address functional mobility deficits, address ROM deficits, address strength deficits, analyze and address soft tissue restrictions, analyze and cue movement patterns and assess and modify postural abnormalities to attain remaining goals. []  See Plan of Care  []  See progress note/recertification  []  See Discharge Summary         Progress towards goals / Updated goals:  Updated Goals: to be achieved in 4 weeks:  1. Pt will increase FOTO score to 66 points to improve ability to perform ADLs. PN: regression, 49 points   2. Pt will report being able to drive for 5 hours with minimal to no increased pain to improve driving ability and tolerance. PN: reports she had not tried long distance driving since eval and will have to drive long distances starting in [email or web address]  3.  Pt report a decrease in at best pain to 2/10 to improve activity tolerance  PN: 3/10    PLAN  [x]  Upgrade activities as tolerated     [x] Continue plan of care  [x]  Update interventions per flow sheet       []  Discharge due to:_  []  Other:_      Briana Francisco, PT 8/14/2017  9:23 AM    Future Appointments  Date Time Provider Ramez Villalobos   8/14/2017 9:00 AM Briana Shaikh, PT [facility] AND [facility] [place] [facility]"" [facility]   8/18/2017 10:00 AM Jeffery Chiu

## 2017-08-18 ENCOUNTER — HOSPITAL ENCOUNTER (OUTPATIENT)
Dept: PHYSICAL THERAPY | Age: 47
Discharge: HOME | End: 2017-08-18
Payer: OTHER GOVERNMENT

## 2017-08-18 PROCEDURE — 97110 THERAPEUTIC EXERCISES: CPT

## 2017-08-18 PROCEDURE — 97140 MANUAL THERAPY 1/> REGIONS: CPT

## 2017-08-18 NOTE — PROGRESS NOTES
PT DAILY TREATMENT NOTE     Patient Name: Estiven Draper  Date:2017  : 1970  [x]  Patient  Verified  Payor:  / Plan: [facility]  RETIREES AND DEPENDENTS / Product Type:  /    In time: 9:10   Out time: 10:02  Total Treatment Time (min): 52  1:1 Treatment time (min): 29  Visit #: 2 of 8    Treatment Area: Strain of other muscles, fascia and tendons at shoulder and upper arm level, unspecified arm, initial encounter [S46.383R]    SUBJECTIVE  Pain Level (0-10 scale): 3  Any medication changes, allergies to medications, adverse drug reactions, diagnosis change, or new procedure performed?: [x] No    [] Yes (see summary sheet for update)  Subjective functional status/changes:   [] No changes reported  \"2 days after the last session, my back felt good. 3/10 today\"    OBJECTIVE    Modality rationale: decrease pain to improve the patients ability to perform ADLs.     Min Type Additional Details    [] Estim:  []Unatt       []IFC  []Premod                        []Other:  []w/ice   []w/heat  Position:  Location:    [] Estim: []Att    []TENS instruct  []NMES                    []Other:  []w/US   []w/ice   []w/heat  Position:  Location:    []  Traction: [] Cervical       []Lumbar                       [] Prone          []Supine                       []Intermittent   []Continuous Lbs:  [] before manual  [] after manual    []  Ultrasound: []Continuous   [] Pulsed                           []1MHz   []3MHz W/cm2:  Location:    []  Iontophoresis with dexamethasone         Location: [] Take home patch   [] In clinic   10 []  Ice     [x]  heat  []  Ice massage  []  Laser   []  Anodyne Position: prone  Location: thoracic spine    []  Laser with stim  []  Other:  Position:  Location:    []  Vasopneumatic Device Pressure:       [] lo [] med [] hi   Temperature: [] lo [] med [] hi   [x] Skin assessment post-treatment:  [x]intact []redness- no adverse reaction    []redness - adverse reaction:     32 min Therapeutic Exercise:  [x] See flow sheet :   Rationale: increase ROM and increase strength to improve the patients ability to perform ADLs. 10 min Manual Therapy:  TPR right rhomboid and t/s paraspinals around T3-4, t/s mobes grade 2   Rationale: decrease pain, increase ROM and increase tissue extensibility to increase ease with ADLs. With   [] TE   [] TA   [] neuro   [] other: Patient Education: [x] Review HEP    [] Progressed/Changed HEP based on:   [] positioning   [] body mechanics   [] transfers   [] heat/ice application    [] other:      Other Objective/Functional Measures: Mild improvement in mobility of the upper/mid t-spine noted during manual interventions. Pain Level (0-10 scale) post treatment: 2    ASSESSMENT/Changes in Function: Continues to have decreased mobility ~ T3-4 region (no pain reported with PAs to this region). Reported tenderness to the right rhomboid and t/s paraspinals around the T3-4 region as well. Added 1# weight to 1/2 prone letters, added chin tuck with lift to improve mobility and flexibility of the c/s. Continue POC as tolerated. Patient will continue to benefit from skilled PT services to modify and progress therapeutic interventions, address functional mobility deficits, address ROM deficits, address strength deficits, analyze and address soft tissue restrictions, analyze and cue movement patterns and assess and modify postural abnormalities to attain remaining goals. []  See Plan of Care  []  See progress note/recertification  []  See Discharge Summary         Progress towards goals / Updated goals:  Updated Goals: to be achieved in 4 weeks:  1. Pt will increase FOTO score to 66 points to improve ability to perform ADLs. PN: regression, 49 points   2. Pt will report being able to drive for 5 hours with minimal to no increased pain to improve driving ability and tolerance.    PN: reports she had not tried long distance driving since eval and will have to drive long distances starting in [place]  3.  Pt report a decrease in at best pain to 2/10 to improve activity tolerance  PN: 3/10  Current: 3/10 pain pre session, 2/10 pain post session 8/18/2017    PLAN  [x]  Upgrade activities as tolerated     [x]  Continue plan of care  [x]  Update interventions per flow sheet       []  Discharge due to:_  []  Other:_      Jacob Holbrook, PT 8/18/2017  9:34 AM    Future Appointments  Date Time Provider Ramez Villalobos   8/18/2017 9:00 AM Jacob Holbrook, PT [facility]

## 2017-08-22 ENCOUNTER — HOSPITAL ENCOUNTER (OUTPATIENT)
Dept: PHYSICAL THERAPY | Age: 47
Discharge: HOME | End: 2017-08-22
Payer: OTHER GOVERNMENT

## 2017-08-22 PROCEDURE — 97110 THERAPEUTIC EXERCISES: CPT

## 2017-08-22 PROCEDURE — 97140 MANUAL THERAPY 1/> REGIONS: CPT

## 2017-08-22 NOTE — PROGRESS NOTES
PT DAILY TREATMENT NOTE     Patient Name: Puala Wrigth  Date:2017  : 1970  [x]  Patient  Verified  Payor:  / Plan: [facility]  RETIREES AND DEPENDENTS / Product Type:  /    In time: 9:36   Out time: 10:34  Total Treatment Time (min): 53 (5 min wait on therapist)  1:1 Treatment time (min): 23  Visit #: 3 of 8    Treatment Area: Strain of other muscles, fascia and tendons at shoulder and upper arm level, unspecified arm, initial encounter [S46.172E]    SUBJECTIVE  Pain Level (0-10 scale): 3  Any medication changes, allergies to medications, adverse drug reactions, diagnosis change, or new procedure performed?: [x] No    [] Yes (see summary sheet for update)  Subjective functional status/changes:   [] No changes reported  \"I went to [address] [place] [address] and did a lot of walking and pushing the stroller. It was hurting but I put a hot pack on after and it helped\". OBJECTIVE    Modality rationale: decrease pain to improve the patients ability to perform ADLs.     Min Type Additional Details    [] Estim:  []Unatt       []IFC  []Premod                        []Other:  []w/ice   []w/heat  Position:  Location:    [] Estim: []Att    []TENS instruct  []NMES                    []Other:  []w/US   []w/ice   []w/heat  Position:  Location:    []  Traction: [] Cervical       []Lumbar                       [] Prone          []Supine                       []Intermittent   []Continuous Lbs:  [] before manual  [] after manual    []  Ultrasound: []Continuous   [] Pulsed                           []1MHz   []3MHz W/cm2:  Location:    []  Iontophoresis with dexamethasone         Location: [] Take home patch   [] In clinic   10 []  Ice     [x]  heat  []  Ice massage  []  Laser   []  Anodyne Position: prone  Location: thoracic spine    []  Laser with stim  []  Other:  Position:  Location:    []  Vasopneumatic Device Pressure:       [] lo [] med [] hi   Temperature: [] lo [] med [] hi   [x] Skin assessment post-treatment:  [x]intact []redness- no adverse reaction    []redness - adverse reaction:     33 min Therapeutic Exercise:  [x] See flow sheet :   Rationale: increase ROM and increase strength to improve the patients ability to perform ADLs. 10 min Manual Therapy:  TPR right rhomboid and t/s paraspinals around T3-4, t/s mobes grade 2   Rationale: decrease pain, increase ROM and increase tissue extensibility to increase ease with ADLs. With   [] TE   [] TA   [] neuro   [] other: Patient Education: [x] Review HEP    [] Progressed/Changed HEP based on:   [] positioning   [] body mechanics   [] transfers   [] heat/ice application    [] other:      Other Objective/Functional Measures: none taken today    Pain Level (0-10 scale) post treatment: 2    ASSESSMENT/Changes in Function: Decreased pain reported post session. Continued to educate pt on proper posture and to continue to perform HEP, especially exercises that promote increased t/s mobility. Educated pt that she can use her own heat pack at home as needed for no more than 15-20 mins at a time for pain relief. Continue POC as tolerated. Patient will continue to benefit from skilled PT services to modify and progress therapeutic interventions, address functional mobility deficits, address ROM deficits, address strength deficits, analyze and address soft tissue restrictions, analyze and cue movement patterns and assess and modify postural abnormalities to attain remaining goals. []  See Plan of Care  []  See progress note/recertification  []  See Discharge Summary         Progress towards goals / Updated goals:  Updated Goals: to be achieved in 4 weeks:  1. Pt will increase FOTO score to 66 points to improve ability to perform ADLs. PN: regression, 49 points   2. Pt will report being able to drive for 5 hours with minimal to no increased pain to improve driving ability and tolerance.    PN: reports she had not tried long distance driving since eval and will have to drive long distances starting in September   3.  Pt report a decrease in at best pain to 2/10 to improve activity tolerance  PN: 3/10  Current: 3/10 pain pre session, 2/10 pain post session 8/18/2017    PLAN  [x]  Upgrade activities as tolerated     [x]  Continue plan of care  [x]  Update interventions per flow sheet       []  Discharge due to:_  []  Other:_      Dayami Mercado, PT 8/22/2017  9:54 AM    Future Appointments  Date Time Provider Ramez Villalobos   8/22/2017 9:30 AM Dayami Mercado, PT [place] [facility]   8/25/2017 1:30 PM Dayami Mercado, PT Karolyn Taylor

## 2017-08-25 ENCOUNTER — HOSPITAL ENCOUNTER (OUTPATIENT)
Dept: PHYSICAL THERAPY | Age: 47
Discharge: HOME | End: 2017-08-25
Payer: OTHER GOVERNMENT

## 2017-08-25 PROCEDURE — 97140 MANUAL THERAPY 1/> REGIONS: CPT

## 2017-08-25 PROCEDURE — 97110 THERAPEUTIC EXERCISES: CPT

## 2017-08-25 NOTE — PROGRESS NOTES
PT DAILY TREATMENT NOTE     Patient Name: Elba Price  Date:2017  : 1970  [x]  Patient  Verified  Payor:  / Plan: [facility]  RETIREES AND DEPENDENTS / Product Type:  /    In time: 9:09   Out time: 9:56  Total Treatment Time (min): 47  1:1 Treatment time (min): 37  Visit #: 4 of 8    Treatment Area: Strain of other muscles, fascia and tendons at shoulder and upper arm level, unspecified arm, initial encounter [S42.933P]    SUBJECTIVE  Pain Level (0-10 scale): 2  Any medication changes, allergies to medications, adverse drug reactions, diagnosis change, or new procedure performed?: [x] No    [] Yes (see summary sheet for update)  Subjective functional status/changes:   [] No changes reported  \"I signed up for the gym next door. The pain is not bad today\"    OBJECTIVE    Modality rationale: decrease pain to improve the patients ability to perform ADLs.     Min Type Additional Details    [] Estim:  []Unatt       []IFC  []Premod                        []Other:  []w/ice   []w/heat  Position:  Location:    [] Estim: []Att    []TENS instruct  []NMES                    []Other:  []w/US   []w/ice   []w/heat  Position:  Location:    []  Traction: [] Cervical       []Lumbar                       [] Prone          []Supine                       []Intermittent   []Continuous Lbs:  [] before manual  [] after manual    []  Ultrasound: []Continuous   [] Pulsed                           []1MHz   []3MHz W/cm2:  Location:    []  Iontophoresis with dexamethasone         Location: [] Take home patch   [] In clinic   10 []  Ice     [x]  heat  []  Ice massage  []  Laser   []  Anodyne Position: prone  Location: thoracic spine    []  Laser with stim  []  Other:  Position:  Location:    []  Vasopneumatic Device Pressure:       [] lo [] med [] hi   Temperature: [] lo [] med [] hi   [x] Skin assessment post-treatment:  [x]intact []redness- no adverse reaction    []redness - adverse reaction:     27 min Therapeutic Exercise:  [x] See flow sheet :   Rationale: increase ROM and increase strength to improve the patients ability to perform ADLs. 10 min Manual Therapy: DTM right rhomboid, t/s mobes grade 2-3   Rationale: decrease pain, increase ROM and increase tissue extensibility to increase ease with ADLs. With   [] TE   [] TA   [] neuro   [] other: Patient Education: [x] Review HEP    [] Progressed/Changed HEP based on:   [] positioning   [] body mechanics   [] transfers   [] heat/ice application    [] other:      Other Objective/Functional Measures: No tenderness reported to the right rhomboid during manual today. Mild limitation in the upper t/s noted with PAs but overall improvement in mobility noted in the upper and mid t/s during PAs. Pain Level (0-10 scale) post treatment: 1    ASSESSMENT/Changes in Function: Added prone letters (Is, Ts, Ws) to improve scapular strength and stability. Reports improvement in pain post session. Continue POC as tolerated. Patient will continue to benefit from skilled PT services to modify and progress therapeutic interventions, address functional mobility deficits, address ROM deficits, address strength deficits, analyze and address soft tissue restrictions, analyze and cue movement patterns and assess and modify postural abnormalities to attain remaining goals. []  See Plan of Care  []  See progress note/recertification  []  See Discharge Summary         Progress towards goals / Updated goals:  Updated Goals: to be achieved in 4 weeks:  1. Pt will increase FOTO score to 66 points to improve ability to perform ADLs. PN: regression, 49 points   2. Pt will report being able to drive for 5 hours with minimal to no increased pain to improve driving ability and tolerance. PN: reports she had not tried long distance driving since eval and will have to drive long distances starting in [place]  3.  Pt report a decrease in at best pain to 2/10 to improve activity tolerance  PN: 3/10  Current: MET 2/10 pain pre session 8/25/2017    PLAN  [x]  Upgrade activities as tolerated     [x]  Continue plan of care  [x]  Update interventions per flow sheet       []  Discharge due to:_  []  Other:_      Karishma Vergara PT 8/25/2017  9:11 AM    Future Appointments  Date Time Provider Ramez Villalobos   8/25/2017 9:00 AM Karishma Vergara PT [facility]   8/29/2017 1:30 PM KOBI Aguilar

## 2017-08-29 ENCOUNTER — HOSPITAL ENCOUNTER (OUTPATIENT)
Dept: PHYSICAL THERAPY | Age: 47
Discharge: HOME | End: 2017-08-29
Payer: OTHER GOVERNMENT

## 2017-08-29 PROCEDURE — 97140 MANUAL THERAPY 1/> REGIONS: CPT

## 2017-08-29 PROCEDURE — 97110 THERAPEUTIC EXERCISES: CPT

## 2017-08-29 NOTE — PROGRESS NOTES
PT DAILY TREATMENT NOTE     Patient Name: Bright Chavarria  Date:2017  : 1970  [x]  Patient  Verified  Payor:  / Plan: [facility]  RETIREES AND DEPENDENTS / Product Type:  /    In time: 1:39   Out time: 2:45  Total Treatment Time (min): 66  1:1 Treatment time (min): 31  Visit #: 5 of 8    Treatment Area: Strain of other muscles, fascia and tendons at shoulder and upper arm level, unspecified arm, initial encounter [X28.145D]    SUBJECTIVE  Pain Level (0-10 scale): 2  Any medication changes, allergies to medications, adverse drug reactions, diagnosis change, or new procedure performed?: [x] No    [] Yes (see summary sheet for update)  Subjective functional status/changes:   [] No changes reported  \"Not bad. I have been trying to go to the gym to keep moving and work on the [employer]    OBJECTIVE    Modality rationale: decrease pain to improve the patients ability to perform ADLs.     Min Type Additional Details    [] Estim:  []Unatt       []IFC  []Premod                        []Other:  []w/ice   []w/heat  Position:  Location:    [] Estim: []Att    []TENS instruct  []NMES                    []Other:  []w/US   []w/ice   []w/heat  Position:  Location:    []  Traction: [] Cervical       []Lumbar                       [] Prone          []Supine                       []Intermittent   []Continuous Lbs:  [] before manual  [] after manual    []  Ultrasound: []Continuous   [] Pulsed                           []1MHz   []3MHz W/cm2:  Location:    []  Iontophoresis with dexamethasone         Location: [] Take home patch   [] In clinic   10 []  Ice     [x]  heat  []  Ice massage  []  Laser   []  Anodyne Position: prone  Location: thoracic spine    []  Laser with stim  []  Other:  Position:  Location:    []  Vasopneumatic Device Pressure:       [] lo [] med [] hi   Temperature: [] lo [] med [] hi   [x] Skin assessment post-treatment:  [x]intact []redness- no adverse reaction    []redness - adverse reaction:     44 min Therapeutic Exercise:  [x] See flow sheet :   Rationale: increase ROM and increase strength to improve the patients ability to perform ADLs. 12 min Manual Therapy: DTM right rhomboid and right t/s paraspinals, t/s mobes grade 2-3   Rationale: decrease pain, increase ROM and increase tissue extensibility to increase ease with ADLs. With   [] TE   [] TA   [] neuro   [] other: Patient Education: [x] Review HEP    [] Progressed/Changed HEP based on:   [] positioning   [] body mechanics   [] transfers   [] heat/ice application    [] other:      Other Objective/Functional Measures: Added 1# weight to prone letters to improve scapular strength. Pain Level (0-10 scale) post treatment: 2    ASSESSMENT/Changes in Function: No pain reported with the exercises today. Pt states that she thinks therapy is helping her pain. Continue POC as tolerated. Patient will continue to benefit from skilled PT services to modify and progress therapeutic interventions, address functional mobility deficits, address ROM deficits, address strength deficits, analyze and address soft tissue restrictions, analyze and cue movement patterns and assess and modify postural abnormalities to attain remaining goals. []  See Plan of Care  []  See progress note/recertification  []  See Discharge Summary         Progress towards goals / Updated goals:  Updated Goals: to be achieved in 4 weeks:  1. Pt will increase FOTO score to 66 points to improve ability to perform ADLs. PN: regression, 49 points   2. Pt will report being able to drive for 5 hours with minimal to no increased pain to improve driving ability and tolerance. PN: reports she had not tried long distance driving since eval and will have to drive long distances starting in [employer]  3.  Pt report a decrease in at best pain to 2/10 to improve activity tolerance  PN: 3/10  Current: MET 2/10 pain pre session 8/25/2017    PLAN  [x]  Upgrade activities as tolerated     [x]  Continue plan of care  [x]  Update interventions per flow sheet       []  Discharge due to:_  []  Other:_      Meche Armendariz, PT 8/29/2017  1:45 PM    Future Appointments  Date Time Provider Ramez Villalobos   8/29/2017 1:30 PM Meche Armendariz, PT [facility]

## 2017-09-01 ENCOUNTER — HOSPITAL ENCOUNTER (OUTPATIENT)
Dept: PHYSICAL THERAPY | Age: 47
Discharge: HOME | End: 2017-09-01
Payer: OTHER GOVERNMENT

## 2017-09-01 PROCEDURE — 97110 THERAPEUTIC EXERCISES: CPT

## 2017-09-01 PROCEDURE — 97140 MANUAL THERAPY 1/> REGIONS: CPT

## 2017-09-01 NOTE — PROGRESS NOTES
PT DAILY TREATMENT NOTE     Patient Name: Lissette Beasley  Date:2017  : 1970  [x]  Patient  Verified  Payor:  / Plan: [facility]  RETIREES AND DEPENDENTS / Product Type:  /    In time: 11:11   Out time: 12:04  Total Treatment Time (min): 53  1:1 Treatment time (min): 43  Visit #: 6 of 8    Treatment Area: Strain of other muscles, fascia and tendons at shoulder and upper arm level, unspecified arm, initial encounter [S45.017S]    SUBJECTIVE  Pain Level (0-10 scale): 3  Any medication changes, allergies to medications, adverse drug reactions, diagnosis change, or new procedure performed?: [x] No    [] Yes (see summary sheet for update)  Subjective functional status/changes:   [] No changes reported  \"I think I slept wrong so it is a little more painful today\"    OBJECTIVE    Modality rationale: decrease pain to improve the patients ability to perform ADLs.     Min Type Additional Details    [] Estim:  []Unatt       []IFC  []Premod                        []Other:  []w/ice   []w/heat  Position:  Location:    [] Estim: []Att    []TENS instruct  []NMES                    []Other:  []w/US   []w/ice   []w/heat  Position:  Location:    []  Traction: [] Cervical       []Lumbar                       [] Prone          []Supine                       []Intermittent   []Continuous Lbs:  [] before manual  [] after manual    []  Ultrasound: []Continuous   [] Pulsed                           []1MHz   []3MHz W/cm2:  Location:    []  Iontophoresis with dexamethasone         Location: [] Take home patch   [] In clinic   10 []  Ice     [x]  heat  []  Ice massage  []  Laser   []  Anodyne Position: prone  Location: thoracic spine    []  Laser with stim  []  Other:  Position:  Location:    []  Vasopneumatic Device Pressure:       [] lo [] med [] hi   Temperature: [] lo [] med [] hi   [x] Skin assessment post-treatment:  [x]intact []redness- no adverse reaction    []redness - adverse reaction:     33 min Therapeutic Exercise:  [x] See flow sheet :   Rationale: increase ROM and increase strength to improve the patients ability to perform ADLs. 10 min Manual Therapy: DTM right t/s paraspinals, t/s mobes grade 2-3, right rib springs and scapular mobility   Rationale: decrease pain, increase ROM and increase tissue extensibility to increase ease with ADLs. With   [] TE   [] TA   [] neuro   [] other: Patient Education: [x] Review HEP    [] Progressed/Changed HEP based on:   [] positioning   [] body mechanics   [] transfers   [] heat/ice application    [] other:      Other Objective/Functional Measures: FOTO: 50 points. Pain Level (0-10 scale) post treatment: 0    ASSESSMENT/Changes in Function: Continues to have limited mobility in the t/s as noted with manual interventions. Reported no pain post session. Continue POC as tolerated. Patient will continue to benefit from skilled PT services to modify and progress therapeutic interventions, address functional mobility deficits, address ROM deficits, address strength deficits, analyze and address soft tissue restrictions, analyze and cue movement patterns and assess and modify postural abnormalities to attain remaining goals. []  See Plan of Care  []  See progress note/recertification  []  See Discharge Summary         Progress towards goals / Updated goals:  Updated Goals: to be achieved in 4 weeks:  1. Pt will increase FOTO score to 66 points to improve ability to perform ADLs. PN: regression, 49 points   Current: not met, 50 points 9/1/2017  2. Pt will report being able to drive for 5 hours with minimal to no increased pain to improve driving ability and tolerance. PN: reports she had not tried long distance driving since eval and will have to drive long distances starting in [employer]  3.  Pt report a decrease in at best pain to 2/10 to improve activity tolerance  PN: 3/10  Current: MET 2/10 pain pre session 8/25/2017    PLAN  [x] Upgrade activities as tolerated     [x]  Continue plan of care  [x]  Update interventions per flow sheet       []  Discharge due to:_  []  Other:_      Meche Armendariz PT 9/1/2017  11:40 AM    Future Appointments  Date Time Provider Ramez Villalobos   9/1/2017 11:00 AM Meche Armendariz, KOBI Maldonado

## 2017-09-06 ENCOUNTER — HOSPITAL ENCOUNTER (OUTPATIENT)
Dept: PHYSICAL THERAPY | Age: 47
Discharge: HOME | End: 2017-09-06
Payer: OTHER GOVERNMENT

## 2017-09-06 PROCEDURE — 97110 THERAPEUTIC EXERCISES: CPT

## 2017-09-06 NOTE — PROGRESS NOTES
PT DAILY TREATMENT NOTE     Patient Name: Lexie Richardson  Date:2017  : 1970  [x]  Patient  Verified  Payor:  / Plan: [facility]  RETIREES AND DEPENDENTS / Product Type:  /    In time: 1:35   Out time: 2:39  Total Treatment Time (min): 64  1:1 Treatment time (min): 34  Visit #: 7 of 8    Treatment Area: Strain of other muscles, fascia and tendons at shoulder and upper arm level, unspecified arm, initial encounter [S46.522P]    SUBJECTIVE  Pain Level (0-10 scale): 3  Any medication changes, allergies to medications, adverse drug reactions, diagnosis change, or new procedure performed?: [x] No    [] Yes (see summary sheet for update)  Subjective functional status/changes:   [] No changes reported  \"I have good days and bad days. I think I need a new couch though because it hurts when I sit on it for too long. \"    OBJECTIVE    Modality rationale: decrease pain and increase tissue extensibility to improve the patients ability to perform ADLs.     Min Type Additional Details    [] Estim:  []Unatt       []IFC  []Premod                        []Other:  []w/ice   []w/heat  Position:  Location:    [] Estim: []Att    []TENS instruct  []NMES                    []Other:  []w/US   []w/ice   []w/heat  Position:  Location:    []  Traction: [] Cervical       []Lumbar                       [] Prone          []Supine                       []Intermittent   []Continuous Lbs:  [] before manual  [] after manual    []  Ultrasound: []Continuous   [] Pulsed                           []1MHz   []3MHz W/cm2:  Location:    []  Iontophoresis with dexamethasone         Location: [] Take home patch   [] In clinic   10 []  Ice     [x]  heat  []  Ice massage  []  Laser   []  Anodyne Position: prone  Location: thoracic spine    []  Laser with stim  []  Other:  Position:  Location:    []  Vasopneumatic Device Pressure:       [] lo [] med [] hi   Temperature: [] lo [] med [] hi   [x] Skin assessment post-treatment:  [x]intact []redness- no adverse reaction    []redness - adverse reaction:     54 min Therapeutic Exercise:  [x] See flow sheet : exercises, discussion on possible d/c NV. Rationale: increase ROM and increase strength to improve the patients ability to perform ADLs. With   [] TE   [] TA   [] neuro   [] other: Patient Education: [x] Review HEP    [] Progressed/Changed HEP based on:   [] positioning   [] body mechanics   [] transfers   [] heat/ice application    [] other:      Other Objective/Functional Measures: Held manual today to see how pt responds without it secondary to possible d/c NV. Needed tactile cueing for proper form of c/s retractions in supine. Pain Level (0-10 scale) post treatment: 2    ASSESSMENT/Changes in Function: No pain reported with exercises today. Will discuss possible d/c or PN NV. Continue POC as tolerated. Patient will continue to benefit from skilled PT services to modify and progress therapeutic interventions, address functional mobility deficits, address ROM deficits, address strength deficits, analyze and address soft tissue restrictions, analyze and cue movement patterns and assess and modify postural abnormalities to attain remaining goals. []  See Plan of Care  []  See progress note/recertification  []  See Discharge Summary         Progress towards goals / Updated goals:  Updated Goals: to be achieved in 4 weeks:  1. Pt will increase FOTO score to 66 points to improve ability to perform ADLs. PN: regression, 49 points   Current: not met, 50 points 9/1/2017  2. Pt will report being able to drive for 5 hours with minimal to no increased pain to improve driving ability and tolerance. PN: reports she had not tried long distance driving since eval and will have to drive long distances starting in [place]  3.  Pt report a decrease in at best pain to 2/10 to improve activity tolerance  PN: 3/10  Current: MET 2/10 pain pre session 8/25/2017    PLAN  [x]  Upgrade activities as tolerated     [x]  Continue plan of care  [x]  Update interventions per flow sheet       []  Discharge due to:_  []  Other:_      Karishma Vergara PT 9/6/2017  1:58 PM    Future Appointments  Date Time Provider Ramez Villalobos   9/6/2017 1:30 PM Karishma Vergara PT [facility]   9/8/2017 1:00 PM Karishma Vergara PT [facility]

## 2017-09-08 ENCOUNTER — HOSPITAL ENCOUNTER (OUTPATIENT)
Dept: PHYSICAL THERAPY | Age: 47
Discharge: HOME | End: 2017-09-08
Payer: OTHER GOVERNMENT

## 2017-09-08 PROCEDURE — 97530 THERAPEUTIC ACTIVITIES: CPT
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
## 2017-10-24 ENCOUNTER — OFFICE VISIT (OUTPATIENT)
Dept: FAMILY MEDICINE CLINIC | Age: 47
End: 2017-10-24

## 2017-10-24 VITALS
BODY MASS INDEX: 24.86 KG/M2 | WEIGHT: 164 LBS | SYSTOLIC BLOOD PRESSURE: 124 MMHG | HEIGHT: 68 IN | OXYGEN SATURATION: 99 % | DIASTOLIC BLOOD PRESSURE: 81 MMHG | HEART RATE: 98 BPM | TEMPERATURE: 98.3 F

## 2017-10-24 DIAGNOSIS — R51.9 NONINTRACTABLE HEADACHE, UNSPECIFIED CHRONICITY PATTERN, UNSPECIFIED HEADACHE TYPE: ICD-10-CM

## 2017-10-24 DIAGNOSIS — R05.9 COUGH: ICD-10-CM

## 2017-10-24 DIAGNOSIS — R68.89 FLU-LIKE SYMPTOMS: Primary | ICD-10-CM

## 2017-10-24 DIAGNOSIS — J01.10 ACUTE NON-RECURRENT FRONTAL SINUSITIS: ICD-10-CM

## 2017-10-24 DIAGNOSIS — J01.00 ACUTE NON-RECURRENT MAXILLARY SINUSITIS: ICD-10-CM

## 2017-10-24 LAB
QUICKVUE INFLUENZA TEST: NEGATIVE
S PYO AG THROAT QL: NEGATIVE
VALID INTERNAL CONTROL?: YES
VALID INTERNAL CONTROL?: YES

## 2017-10-24 RX ORDER — AMOXICILLIN AND CLAVULANATE POTASSIUM 875; 125 MG/1; MG/1
1 TABLET, FILM COATED ORAL 2 TIMES DAILY
Qty: 14 TAB | Refills: 0 | Status: SHIPPED | OUTPATIENT
Start: 2017-10-24 | End: 2017-10-31

## 2017-10-24 RX ORDER — BENZONATATE 200 MG/1
200 CAPSULE ORAL
Qty: 30 CAP | Refills: 0 | Status: SHIPPED | OUTPATIENT
Start: 2017-10-24 | End: 2017-10-31

## 2017-10-24 NOTE — PATIENT INSTRUCTIONS
Headache: Care Instructions  Your Care Instructions    Headaches have many possible causes. Most headaches aren't a sign of a more serious problem, and they will get better on their own. Home treatment may help you feel better faster. The doctor has checked you carefully, but problems can develop later. If you notice any problems or new symptoms, get medical treatment right away. Follow-up care is a key part of your treatment and safety. Be sure to make and go to all appointments, and call your doctor if you are having problems. It's also a good idea to know your test results and keep a list of the medicines you take. How can you care for yourself at home? · Do not drive if you have taken a prescription pain medicine. · Rest in a quiet, dark room until your headache is gone. Close your eyes and try to relax or go to sleep. Don't watch TV or read. · Put a cold, moist cloth or cold pack on the painful area for 10 to 20 minutes at a time. Put a thin cloth between the cold pack and your skin. · Use a warm, moist towel or a heating pad set on low to relax tight shoulder and neck muscles. · Have someone gently massage your neck and shoulders. · Take pain medicines exactly as directed. ¨ If the doctor gave you a prescription medicine for pain, take it as prescribed. ¨ If you are not taking a prescription pain medicine, ask your doctor if you can take an over-the-counter medicine. · Be careful not to take pain medicine more often than the instructions allow, because you may get worse or more frequent headaches when the medicine wears off. · Do not ignore new symptoms that occur with a headache, such as a fever, weakness or numbness, vision changes, or confusion. These may be signs of a more serious problem. To prevent headaches  · Keep a headache diary so you can figure out what triggers your headaches. Avoiding triggers may help you prevent headaches.  Record when each headache began, how long it lasted, and what the pain was like (throbbing, aching, stabbing, or dull). Write down any other symptoms you had with the headache, such as nausea, flashing lights or dark spots, or sensitivity to bright light or loud noise. Note if the headache occurred near your period. List anything that might have triggered the headache, such as certain foods (chocolate, cheese, wine) or odors, smoke, bright light, stress, or lack of sleep. · Find healthy ways to deal with stress. Headaches are most common during or right after stressful times. Take time to relax before and after you do something that has caused a headache in the past.  · Try to keep your muscles relaxed by keeping good posture. Check your jaw, face, neck, and shoulder muscles for tension, and try relaxing them. When sitting at a desk, change positions often, and stretch for 30 seconds each hour. · Get plenty of sleep and exercise. · Eat regularly and well. Long periods without food can trigger a headache. · Treat yourself to a massage. Some people find that regular massages are very helpful in relieving tension. · Limit caffeine by not drinking too much coffee, tea, or soda. But don't quit caffeine suddenly, because that can also give you headaches. · Reduce eyestrain from computers by blinking frequently and looking away from the computer screen every so often. Make sure you have proper eyewear and that your monitor is set up properly, about an arm's length away. · Seek help if you have depression or anxiety. Your headaches may be linked to these conditions. Treatment can both prevent headaches and help with symptoms of anxiety or depression. When should you call for help? Call 911 anytime you think you may need emergency care. For example, call if:  · You have signs of a stroke. These may include:  ¨ Sudden numbness, paralysis, or weakness in your face, arm, or leg, especially on only one side of your body. ¨ Sudden vision changes.   ¨ Sudden trouble speaking. ¨ Sudden confusion or trouble understanding simple statements. ¨ Sudden problems with walking or balance. ¨ A sudden, severe headache that is different from past headaches. Call your doctor now or seek immediate medical care if:  · You have a new or worse headache. · Your headache gets much worse. Where can you learn more? Go to http://robinson-pete.info/. Enter M271 in the search box to learn more about \"Headache: Care Instructions. \"  Current as of: October 14, 2016  Content Version: 11.3  © 4109-8790 [employer]. Care instructions adapted under license by [employer] (which disclaims liability or warranty for this information). If you have questions about a medical condition or this instruction, always ask your healthcare professional. [address] any warranty or liability for your use of this information. Amoxicillin/Clavulanate Potassium (By mouth)   Amoxicillin (e-gxp-j-ALICE-in), Clavulanate Potassium (LNWM-yz-mx-heather jyb-OSQ-xi-um)  Treats infections. This medicine is a penicillin antibiotic. Brand Name(s): Augmentin, Augmentin ES-600, Augmentin XR   There may be other brand names for this medicine. When This Medicine Should Not Be Used: This medicine is not right for everyone. Do not use it if you had an allergic reaction to amoxicillin, clavulanate, or a similar antibiotic (penicillin or cephalosporin), or if you had liver problems caused by Augmentin®. How to Use This Medicine:   Liquid, Tablet, Chewable Tablet, Long Acting Tablet  · Your doctor will tell you how much medicine to use. Do not use more than directed. · Take this medicine with a snack or at the beginning of a meal to help prevent nausea. · Chewable tablets: Chew the tablet completely before you swallow it. · Measure the oral liquid medicine with a marked measuring spoon, oral syringe, or medicine cup.  Shake the medicine well just before you measure each dose. Rinse the spoon or dropper after each use. · Swallow the extended-release tablet whole. Do not crush, break, or chew it. · Take all of the medicine in your prescription to clear up your infection, even if you feel better after the first few doses. · Missed dose: Take a dose as soon as you remember. If it is almost time for your next dose, wait until then and take a regular dose. Do not take extra medicine to make up for a missed dose. · Tablet, extended-release tablet, chewable tablet: Store at room temperature, away from heat, moisture, and direct light. · Oral liquid: Store in the refrigerator. Do not freeze. · Throw away any unused oral liquid after 10 days. Drugs and Foods to Avoid:   Ask your doctor or pharmacist before using any other medicine, including over-the-counter medicines, vitamins, and herbal products. · Some medicines can affect how this medicine works. Tell your doctor if you are taking a blood thinner (such as warfarin), allopurinol, or probenecid. Warnings While Using This Medicine:   · Tell your doctor if you are pregnant or breastfeeding, or if you have kidney disease, liver disease, or mononucleosis (mono). · Birth control pills may not work as well while you are taking this medicine. Use another form of birth control to prevent pregnancy. · This medicine can cause diarrhea. Call your doctor if the diarrhea becomes severe, does not stop, or is bloody. Do not take any medicine to stop diarrhea until you have talked to your doctor. Diarrhea can occur 2 months or more after you stop taking this medicine. · Tell any doctor or dentist who treats you that you are using this medicine. This medicine may affect certain medical test results. · Call your doctor if your symptoms do not improve or if they get worse. · The chewable tablet and oral liquid contain phenylalanine. Talk to your doctor before you use this medicine if you have phenylketonuria (PKU).   · Keep all medicine out of the reach of children. Never share your medicine with anyone. Possible Side Effects While Using This Medicine:   Call your doctor right away if you notice any of these side effects:  · Allergic reaction: Itching or hives, swelling in your face or hands, swelling or tingling in your mouth or throat, chest tightness, trouble breathing  · Blistering, peeling, red skin rash  · Change in how much or how often you urinate  · Dark urine or pale stools, nausea, vomiting, loss of appetite, stomach pain, yellow eyes or skin  · Diarrhea that may contain blood, stomach cramps  If you notice these less serious side effects, talk with your doctor:   · Diaper rash  · Mild diarrhea, nausea, vomiting  · Tooth discoloration (in children)  If you notice other side effects that you think are caused by this medicine, tell your doctor. Call your doctor for medical advice about side effects. You may report side effects to FDA at 7-150-FDA-2670  © 2017 [address]  Information is for End User's use only and may not be sold, redistributed or otherwise used for commercial purposes. The above information is an  only. It is not intended as medical advice for individual conditions or treatments. Talk to your doctor, nurse or pharmacist before following any medical regimen to see if it is safe and effective for you. Benzonatate (By mouth)   Benzonatate (hpe-QTN-hq-jenkins)  Treats cough. Brand Name(s): Luis Felipe Calhoun   There may be other brand names for this medicine. When This Medicine Should Not Be Used: You should not use this medicine if you have had an allergic reaction to benzonatate in the past.   How to Use This Medicine:   Capsule, Liquid Filled Capsule  · Your doctor will tell you how much medicine to take and how often. · Swallow the capsules whole; do not crush or chew. Chewing the capsule may numb your mouth and throat and you could choke.   If a dose is missed:   · Take the missed dose as soon as possible. · If it is almost time for the next dose, wait until then to take your medicine and skip the missed dose. · You should not use two doses at the same time. How to Store and Dispose of This Medicine:   · Keep this medication in the original tightly closed, light-resistant container. · Store at room temperature, away from heat, moisture, and light. · Ask your pharmacist, doctor, or health caregiver about the best way to dispose of any outdated medicine or medicine no longer needed. · Keep all medicine away from children. Drugs and Foods to Avoid:   Ask your doctor or pharmacist before using any other medicine, including over-the-counter medicines, vitamins, and herbal products. · Avoid drinking alcohol while taking this medicine. Warnings While Using This Medicine:   · If you are pregnant or breastfeeding, talk to your doctor before taking this medicine. · Benzonatate is not recommended for children younger than 8years old. Possible Side Effects While Using This Medicine:   Call your doctor right away if you notice any of these side effects:  · Trouble breathing  · Tightness in the chest or throat  · Tremors, shakiness, seizures  · Skin rash, itching  If you notice these less serious side effects, talk with your doctor:   · Nausea, upset stomach  · Headache  · Mild dizziness  · Drowsiness  · Constipation  · Stuffy nose  If you notice other side effects that you think are caused by this medicine, tell your doctor. Call your doctor for medical advice about side effects. You may report side effects to FDA at 3-632-FDA-1638  © 2017 [address] Information is for End User's use only and may not be sold, redistributed or otherwise used for commercial purposes. The above information is an  only. It is not intended as medical advice for individual conditions or treatments.  Talk to your doctor, nurse or pharmacist before following any medical regimen to see if it is safe and effective for you. Benzonatate (Tessalon Perles, Zonatuss) - (By mouth)   Why this medicine is used:   Treats cough. Contact a nurse or doctor right away if you have:  · Skin rash     Common side effects:  · Drowsiness  · Nausea, upset stomach  · Headache  © 2017 [address] is for End User's use only and may not be sold, redistributed or otherwise used for commercial purposes.

## 2017-10-24 NOTE — MR AVS SNAPSHOT
Visit Information Date & Time Provider Department Dept. Phone Encounter #  
 10/24/2017  1:45 PM Anita Mallory NP [address] [phone] [id number] Upcoming Health Maintenance Date Due INFLUENZA AGE 9 TO ADULT 8/1/2017 PAP AKA CERVICAL CYTOLOGY 4/28/2020 DTaP/Tdap/Td series (3 - Td) 6/5/2027 Allergies as of 10/24/2017  Review Complete On: 10/24/2017 By: Ibeth Mullen LPN No Known Allergies Current Immunizations  Reviewed on 6/5/2017 Name Date Influenza Nasal Vaccine 12/3/2014 Tdap 6/5/2017, 1/1/2005 Not reviewed this visit You Were Diagnosed With   
  
 Codes Comments Flu-like symptoms    -  Primary ICD-10-CM: R68.89 ICD-9-CM: 780.99 Acute non-recurrent maxillary sinusitis     ICD-10-CM: J01.00 ICD-9-CM: 461.0 Acute non-recurrent frontal sinusitis     ICD-10-CM: J01.10 ICD-9-CM: 316.4 Cough     ICD-10-CM: R05 ICD-9-CM: 786.2 Nonintractable headache, unspecified chronicity pattern, unspecified headache type     ICD-10-CM: R51 ICD-9-CM: 524. 0 Vitals BP Pulse Temp Height(growth percentile) Weight(growth percentile) SpO2  
 124/81 (BP 1 Location: Left arm, BP Patient Position: Sitting) 98 98.3 °F (36.8 °C) (Oral) 5' 8\" (1.727 m) 164 lb (74.4 kg) 99% BMI OB Status Smoking Status 24.94 kg/m2 Perimenopausal Former Smoker BMI and BSA Data Body Mass Index Body Surface Area 24.94 kg/m 2 1.89 m 2 Preferred Pharmacy Pharmacy Name Phone [facility] [address], [address] [place] Villanueva [phone] Your Updated Medication List  
  
   
This list is accurate as of: 10/24/17  2:40 PM.  Always use your most recent med list.  
  
  
  
  
 amoxicillin-clavulanate 875-125 mg per tablet Commonly known as:  AUGMENTIN Take 1 Tab by mouth two (2) times a day for 7 days. benzonatate 200 mg capsule Commonly known as:  TESSALON  
 Take 1 Cap by mouth three (3) times daily as needed for Cough for up to 7 days. cetirizine 10 mg tablet Commonly known as:  ZYRTEC Take 1 tablet by mouth daily. fluticasone 50 mcg/actuation nasal spray Commonly known as:  [place] Peel 2 Sprays by Both Nostrils route daily. ibuprofen 600 mg tablet Commonly known as:  MOTRIN Take 1 Tab by mouth every eight (8) hours as needed for Pain. UNISOM (DOXYLAMINE) 25 mg tablet Generic drug:  doxylamine succinate Take 50 mg by mouth nightly as needed for Sleep.  
  
 varenicline 0.5 mg (11)- 1 mg (42) Dspk Commonly known as:  CHANTIX STARTER KALEB Use started pack as directed Prescriptions Printed Refills  
 amoxicillin-clavulanate (AUGMENTIN) 875-125 mg per tablet 0 Sig: Take 1 Tab by mouth two (2) times a day for 7 days. Class: Print Route: Oral  
 benzonatate (TESSALON) 200 mg capsule 0 Sig: Take 1 Cap by mouth three (3) times daily as needed for Cough for up to 7 days. Class: Print Route: Oral  
  
We Performed the Following AMB POC RAPID INFLUENZA TEST [59740 CPT(R)] AMB POC RAPID STREP A [47408 CPT(R)] Patient Instructions Headache: Care Instructions Your Care Instructions Headaches have many possible causes. Most headaches aren't a sign of a more serious problem, and they will get better on their own. Home treatment may help you feel better faster. The doctor has checked you carefully, but problems can develop later. If you notice any problems or new symptoms, get medical treatment right away. Follow-up care is a key part of your treatment and safety. Be sure to make and go to all appointments, and call your doctor if you are having problems. It's also a good idea to know your test results and keep a list of the medicines you take. How can you care for yourself at home? · Do not drive if you have taken a prescription pain medicine. · Rest in a quiet, dark room until your headache is gone. Close your eyes and try to relax or go to sleep. Don't watch TV or read. · Put a cold, moist cloth or cold pack on the painful area for 10 to 20 minutes at a time. Put a thin cloth between the cold pack and your skin. · Use a warm, moist towel or a heating pad set on low to relax tight shoulder and neck muscles. · Have someone gently massage your neck and shoulders. · Take pain medicines exactly as directed. ¨ If the doctor gave you a prescription medicine for pain, take it as prescribed. ¨ If you are not taking a prescription pain medicine, ask your doctor if you can take an over-the-counter medicine. · Be careful not to take pain medicine more often than the instructions allow, because you may get worse or more frequent headaches when the medicine wears off. · Do not ignore new symptoms that occur with a headache, such as a fever, weakness or numbness, vision changes, or confusion. These may be signs of a more serious problem. To prevent headaches · Keep a headache diary so you can figure out what triggers your headaches. Avoiding triggers may help you prevent headaches. Record when each headache began, how long it lasted, and what the pain was like (throbbing, aching, stabbing, or dull). Write down any other symptoms you had with the headache, such as nausea, flashing lights or dark spots, or sensitivity to bright light or loud noise. Note if the headache occurred near your period. List anything that might have triggered the headache, such as certain foods (chocolate, cheese, wine) or odors, smoke, bright light, stress, or lack of sleep. · Find healthy ways to deal with stress. Headaches are most common during or right after stressful times. Take time to relax before and after you do something that has caused a headache in the past. 
· Try to keep your muscles relaxed by keeping good posture.  Check your jaw, face, neck, and shoulder muscles for tension, and try relaxing them. When sitting at a desk, change positions often, and stretch for 30 seconds each hour. · Get plenty of sleep and exercise. · Eat regularly and well. Long periods without food can trigger a headache. · Treat yourself to a massage. Some people find that regular massages are very helpful in relieving tension. · Limit caffeine by not drinking too much coffee, tea, or soda. But don't quit caffeine suddenly, because that can also give you headaches. · Reduce eyestrain from computers by blinking frequently and looking away from the computer screen every so often. Make sure you have proper eyewear and that your monitor is set up properly, about an arm's length away. · Seek help if you have depression or anxiety. Your headaches may be linked to these conditions. Treatment can both prevent headaches and help with symptoms of anxiety or depression. When should you call for help? Call 911 anytime you think you may need emergency care. For example, call if: 
· You have signs of a stroke. These may include: 
¨ Sudden numbness, paralysis, or weakness in your face, arm, or leg, especially on only one side of your body. ¨ Sudden vision changes. ¨ Sudden trouble speaking. ¨ Sudden confusion or trouble understanding simple statements. ¨ Sudden problems with walking or balance. ¨ A sudden, severe headache that is different from past headaches. Call your doctor now or seek immediate medical care if: 
· You have a new or worse headache. · Your headache gets much worse. Where can you learn more? Go to http://robinson-pete.info/. Enter M271 in the search box to learn more about \"Headache: Care Instructions. \" Current as of: October 14, 2016 Content Version: 11.3 © 7582-3691 [employer].  Care instructions adapted under license by [employer] (which disclaims liability or warranty for this information). If you have questions about a medical condition or this instruction, always ask your healthcare professional. [address] any warranty or liability for your use of this information. Amoxicillin/Clavulanate Potassium (By mouth) Amoxicillin (j-dbb-e-ALICE-in), Clavulanate Potassium (OSAH-td-iw-heather cpf-TSL-kj-um) Treats infections. This medicine is a penicillin antibiotic. Brand Name(s): Augmentin, Augmentin ES-600, Augmentin XR There may be other brand names for this medicine. When This Medicine Should Not Be Used: This medicine is not right for everyone. Do not use it if you had an allergic reaction to amoxicillin, clavulanate, or a similar antibiotic (penicillin or cephalosporin), or if you had liver problems caused by Augmentin®. How to Use This Medicine:  
Liquid, Tablet, Chewable Tablet, Long Acting Tablet · Your doctor will tell you how much medicine to use. Do not use more than directed. · Take this medicine with a snack or at the beginning of a meal to help prevent nausea. · Chewable tablets: Chew the tablet completely before you swallow it. · Measure the oral liquid medicine with a marked measuring spoon, oral syringe, or medicine cup. Shake the medicine well just before you measure each dose. Rinse the spoon or dropper after each use. · Swallow the extended-release tablet whole. Do not crush, break, or chew it. · Take all of the medicine in your prescription to clear up your infection, even if you feel better after the first few doses. · Missed dose: Take a dose as soon as you remember. If it is almost time for your next dose, wait until then and take a regular dose. Do not take extra medicine to make up for a missed dose. · Tablet, extended-release tablet, chewable tablet: Store at room temperature, away from heat, moisture, and direct light. · Oral liquid: Store in the refrigerator. Do not freeze. · Throw away any unused oral liquid after 10 days. Drugs and Foods to Avoid: Ask your doctor or pharmacist before using any other medicine, including over-the-counter medicines, vitamins, and herbal products. · Some medicines can affect how this medicine works. Tell your doctor if you are taking a blood thinner (such as warfarin), allopurinol, or probenecid. Warnings While Using This Medicine: · Tell your doctor if you are pregnant or breastfeeding, or if you have kidney disease, liver disease, or mononucleosis (mono). · Birth control pills may not work as well while you are taking this medicine. Use another form of birth control to prevent pregnancy. · This medicine can cause diarrhea. Call your doctor if the diarrhea becomes severe, does not stop, or is bloody. Do not take any medicine to stop diarrhea until you have talked to your doctor. Diarrhea can occur 2 months or more after you stop taking this medicine. · Tell any doctor or dentist who treats you that you are using this medicine. This medicine may affect certain medical test results. · Call your doctor if your symptoms do not improve or if they get worse. · The chewable tablet and oral liquid contain phenylalanine. Talk to your doctor before you use this medicine if you have phenylketonuria (PKU). · Keep all medicine out of the reach of children. Never share your medicine with anyone. Possible Side Effects While Using This Medicine:  
Call your doctor right away if you notice any of these side effects: · Allergic reaction: Itching or hives, swelling in your face or hands, swelling or tingling in your mouth or throat, chest tightness, trouble breathing · Blistering, peeling, red skin rash · Change in how much or how often you urinate · Dark urine or pale stools, nausea, vomiting, loss of appetite, stomach pain, yellow eyes or skin · Diarrhea that may contain blood, stomach cramps If you notice these less serious side effects, talk with your doctor: · Diaper rash · Mild diarrhea, nausea, vomiting · Tooth discoloration (in children) If you notice other side effects that you think are caused by this medicine, tell your doctor. Call your doctor for medical advice about side effects. You may report side effects to FDA at 8-666-FPC-4645 © 2017 [address] Information is for End User's use only and may not be sold, redistributed or otherwise used for commercial purposes. The above information is an  only. It is not intended as medical advice for individual conditions or treatments. Talk to your doctor, nurse or pharmacist before following any medical regimen to see if it is safe and effective for you. Benzonatate (By mouth) Benzonatate (htk-BZZ-uv-jenkins) Treats cough. Brand Name(s): Luis Felipe Calhoun There may be other brand names for this medicine. When This Medicine Should Not Be Used: You should not use this medicine if you have had an allergic reaction to benzonatate in the past.  
How to Use This Medicine:  
Capsule, Liquid Filled Capsule · Your doctor will tell you how much medicine to take and how often. · Swallow the capsules whole; do not crush or chew. Chewing the capsule may numb your mouth and throat and you could choke. If a dose is missed: · Take the missed dose as soon as possible. · If it is almost time for the next dose, wait until then to take your medicine and skip the missed dose. · You should not use two doses at the same time. How to Store and Dispose of This Medicine:  
· Keep this medication in the original tightly closed, light-resistant container. · Store at room temperature, away from heat, moisture, and light. · Ask your pharmacist, doctor, or health caregiver about the best way to dispose of any outdated medicine or medicine no longer needed. · Keep all medicine away from children. Drugs and Foods to Avoid: Ask your doctor or pharmacist before using any other medicine, including over-the-counter medicines, vitamins, and herbal products. · Avoid drinking alcohol while taking this medicine. Warnings While Using This Medicine: · If you are pregnant or breastfeeding, talk to your doctor before taking this medicine. · Benzonatate is not recommended for children younger than 8years old. Possible Side Effects While Using This Medicine:  
Call your doctor right away if you notice any of these side effects: · Trouble breathing · Tightness in the chest or throat · Tremors, shakiness, seizures · Skin rash, itching If you notice these less serious side effects, talk with your doctor: · Nausea, upset stomach 
· Headache · Mild dizziness · Drowsiness · Constipation · Stuffy nose If you notice other side effects that you think are caused by this medicine, tell your doctor. Call your doctor for medical advice about side effects. You may report side effects to FDA at 9-910-FDA-9084 © 2017 [address] Information is for End User's use only and may not be sold, redistributed or otherwise used for commercial purposes. The above information is an  only. It is not intended as medical advice for individual conditions or treatments. Talk to your doctor, nurse or pharmacist before following any medical regimen to see if it is safe and effective for you. Benzonatate (Tessalon Perles, Zonatuss) - (By mouth) Why this medicine is used:  
Treats cough. Contact a nurse or doctor right away if you have: 
· Skin rash Common side effects: 
· Drowsiness · Nausea, upset stomach 
· Headache © 2017 [address] Information is for End User's use only and may not be sold, redistributed or otherwise used for commercial purposes. Introducing [facility]!    
 Raquel Shen introduces [employer] patient portal. Now you can access parts of your medical record, email your doctor's office, and request medication refills online. 1. In your internet browser, go to [email or web address] 2. Click on the First Time User? Click Here link in the Sign In box. You will see the New Member Sign Up page. 3. Enter your [facility] Access Code exactly as it appears below. You will not need to use this code after youve completed the sign-up process. If you do not sign up before the expiration date, you must request a new code. · [facility] [employer] Access Code: [email or web address] Expires: 1/22/2018  1:47 PM 
 
4. Enter the last four digits of your Social Security Number (xxxx) and Date of Birth (mm/dd/yyyy) as indicated and click Submit. You will be taken to the next sign-up page. 5. Create a [facility] ID. This will be your [facility] [employer] login ID and cannot be changed, so think of one that is secure and easy to remember. 6. Create a [facility] [employer] password. You can change your password at any time. 7. Enter your Password Reset Question and Answer. This can be used at a later time if you forget your password. 8. Enter your e-mail address. You will receive e-mail notification when new information is available in [address] 9. Click Sign Up. You can now view and download portions of your medical record. 10. Click the Download Summary menu link to download a portable copy of your medical information. If you have questions, please visit the Frequently Asked Questions section of the [employer] website. Remember, [facility] is NOT to be used for urgent needs. For medical emergencies, dial 911. Now available from your iPhone and Android! Please provide this summary of care documentation to your next provider. Your primary care clinician is listed as Milana Sampson. If you have any questions after today's visit, please call [phone].

## 2017-10-24 NOTE — PROGRESS NOTES
HPI  Jaquelin Cabrera is a 52 y.o. female  Chief Complaint   Patient presents with    Sinus Infection     Pt states that she has had symptoms for over a week. Pt states she has taken OTC mucinex and it gave no relief. Pt did have fever and chills 2 days ago. Reports using Mucinex for about 4 days and states symptoms have not changed. Reports taking a spoon of honey for coughing and Vicks for coughing. Reports cough is worse at night. Reports she nasal secretions which are white. Reports fevers two day ago and increased sweating at night since this. Reports taking tylenol which was effective for fevers. Reports throat pain that increases with cough. Reports an increase in fatigue level. Reports she was around a lot of ill people this weekend. Denies having the flu shot. Reports headache to left side of head since Saturday ranging from 4-5/10. Reports her left eye is sensitive to light causing her headache to increase. Denies blurry vision or vision loss. 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 Reports sides of neck hurt. Past Medical History  Past Medical History:   Diagnosis Date    Endometriosis     Fibroids 2008    s/p ablation 08, takes naproxen regularly for menstrual cramping    Genital HSV 2014    Shoulder disorder 2016    left, rtc tendinosis, bursitis, AC arthritis, labral pathology       Surgical History  Past Surgical History:   Procedure Laterality Date    HX OTHER SURGICAL  approx 2007    fibroid embolization         Medications  Current Outpatient Prescriptions   Medication Sig Dispense Refill    amoxicillin-clavulanate (AUGMENTIN) 875-125 mg per tablet Take 1 Tab by mouth two (2) times a day for 7 days. 14 Tab 0    benzonatate (TESSALON) 200 mg capsule Take 1 Cap by mouth three (3) times daily as needed for Cough for up to 7 days.  30 Cap 0    varenicline (CHANTIX STARTER KALEB) 0.5 mg (11)- 1 mg (42) DsPk Use started pack as directed 1 Dose Pack 0    doxylamine succinate (UNISOM, DOXYLAMINE,) 25 mg tablet Take 50 mg by mouth nightly as needed for Sleep.  ibuprofen (MOTRIN) 600 mg tablet Take 1 Tab by mouth every eight (8) hours as needed for Pain. 60 Tab 0    cetirizine (ZYRTEC) 10 mg tablet Take 1 tablet by mouth daily. (Patient taking differently: Take 10 mg by mouth daily as needed.) 30 tablet 11    fluticasone (FLONASE) 50 mcg/actuation nasal spray 2 Sprays by Both Nostrils route daily. (Patient taking differently: 2 Sprays by Both Nostrils route daily as needed.) 1 Bottle 2       Allergies  No Known Allergies    Family History  Family History   Problem Relation Age of Onset    Hypertension Mother     Heart Disease Mother     Hypertension Father        Social History  Social History     Social History    Marital status:      Spouse name: N/A    Number of children: N/A    Years of education: N/A     Occupational History    housewife Unknown     Social History Main Topics    Smoking status: Former Smoker     Packs/day: 0.50     Years: 10.00     Types: Cigarettes     Quit date: 1/1/2014    Smokeless tobacco: Never Used    Alcohol use Yes      Comment: \"occasional\"    Drug use: No    Sexual activity: Yes     Partners: Male     Birth control/ protection: None     Other Topics Concern    Not on file     Social History Narrative       Problem List  Patient Active Problem List   Diagnosis Code    Vasomotor flushing R23.2    Endometritis N71.9    Libido, decreased R68.82       Review of Systems  Review of Systems   Constitutional: Positive for chills and fever. HENT: Positive for congestion, sinus pain and sore throat. Negative for ear pain. Eyes: Positive for photophobia. Respiratory: Positive for cough. Negative for sputum production, shortness of breath and wheezing. Cardiovascular: Negative for chest pain. Gastrointestinal: Negative for abdominal pain, nausea and vomiting.    Musculoskeletal: Positive for neck pain. Vital Signs  Vitals:    10/24/17 1401   BP: 124/81   Pulse: 98   Temp: 98.3 °F (36.8 °C)   TempSrc: Oral   SpO2: 99%   Weight: 164 lb (74.4 kg)   Height: 5' 8\" (1.727 m)   PainSc:   0 - No pain       Physical Exam  Physical Exam   Constitutional: She is oriented to person, place, and time. Vital signs are normal. She has a sickly appearance. HENT:   Right Ear: Tympanic membrane and ear canal normal.   Left Ear: Tympanic membrane and ear canal normal.   Nose: Rhinorrhea present. Left sinus exhibits maxillary sinus tenderness and frontal sinus tenderness. Mouth/Throat: Oropharyngeal exudate and posterior oropharyngeal edema present. Thick white drainage. Eyes: Conjunctivae and EOM are normal. Pupils are equal, round, and reactive to light. Neck: Normal range of motion. No JVD present. Cardiovascular: Normal rate, regular rhythm and normal heart sounds. No murmur heard. Pulmonary/Chest: Effort normal and breath sounds normal. No respiratory distress. She has no wheezes. Lymphadenopathy:     She has cervical adenopathy. Neurological: She is alert and oriented to person, place, and time. Psychiatric: She has a normal mood and affect. Her behavior is normal. Judgment and thought content normal.   Vitals reviewed. Diagnostics  Orders Placed This Encounter    AMB POC RAPID STREP A    AMB POC RAPID INFLUENZA TEST    amoxicillin-clavulanate (AUGMENTIN) 875-125 mg per tablet     Sig: Take 1 Tab by mouth two (2) times a day for 7 days. Dispense:  14 Tab     Refill:  0    benzonatate (TESSALON) 200 mg capsule     Sig: Take 1 Cap by mouth three (3) times daily as needed for Cough for up to 7 days.      Dispense:  30 Cap     Refill:  0       Results  Results for orders placed or performed in visit on 10/24/17   AMB POC RAPID STREP A   Result Value Ref Range    VALID INTERNAL CONTROL POC Yes     Group A Strep Ag Negative Negative   AMB POC RAPID INFLUENZA TEST   Result Value Ref Range    VALID INTERNAL CONTROL POC Yes     QuickVue Influenza test Negative Negative         Assessment and Plan  Diagnoses and all orders for this visit:    1. Flu-like symptoms  -     AMB POC RAPID STREP A  -     AMB POC RAPID INFLUENZA TEST    2. Acute non-recurrent maxillary sinusitis  -     amoxicillin-clavulanate (AUGMENTIN) 875-125 mg per tablet; Take 1 Tab by mouth two (2) times a day for 7 days. 3. Acute non-recurrent frontal sinusitis  -     amoxicillin-clavulanate (AUGMENTIN) 875-125 mg per tablet; Take 1 Tab by mouth two (2) times a day for 7 days. 4. Cough  -     benzonatate (TESSALON) 200 mg capsule; Take 1 Cap by mouth three (3) times daily as needed for Cough for up to 7 days. 5. Nonintractable headache, unspecified chronicity pattern, unspecified headache type    Medication, side effects, possible allergic reactions and warnings reviewed with patient. Patient verbalized understanding. Discussed good hand hygiene. Increase fluids. OTC ibuprofen for fevers and throat pain. After care summary printed and reviewed with patient. Plan reviewed with patient. Questions answered. Patient verbalized understanding of plan and is in agreement with plan. Patient to follow up with PCP as needed or earlier if symptoms worsen or do not improve.      MARIMAR Lemus

## 2017-10-24 NOTE — PROGRESS NOTES
1. Have you been to the ER, urgent care clinic since your last visit? Hospitalized since your last visit? No    2. Have you seen or consulted any other health care providers outside of the [address] [place] [address] since your last visit? Include any pap smears or colon screening. No    Is someone accompanying this pt? no    Is the patient using any DME equipment during OV? no      Chief Complaint   Patient presents with    Sinus Infection     Pt states that she has had symptoms for over a week. Pt states she has taken OTC mucinex and it gave no relief. Pt did have fever and chills 2 days ago.

## 2018-04-18 ENCOUNTER — HOSPITAL ENCOUNTER (OUTPATIENT)
Dept: MAMMOGRAPHY | Age: 48
Discharge: HOME | End: 2018-04-18
Attending: FAMILY MEDICINE
Payer: OTHER GOVERNMENT

## 2018-04-18 DIAGNOSIS — Z12.31 VISIT FOR SCREENING MAMMOGRAM: ICD-10-CM

## 2018-04-18 PROCEDURE — 77067 SCR MAMMO BI INCL CAD: CPT

## 2018-06-08 ENCOUNTER — OFFICE VISIT (OUTPATIENT)
Dept: FAMILY MEDICINE CLINIC | Age: 48
End: 2018-06-08

## 2018-06-08 VITALS
HEIGHT: 68 IN | TEMPERATURE: 98.8 F | OXYGEN SATURATION: 98 % | SYSTOLIC BLOOD PRESSURE: 125 MMHG | HEART RATE: 85 BPM | BODY MASS INDEX: 25.46 KG/M2 | WEIGHT: 168 LBS | RESPIRATION RATE: 16 BRPM | DIASTOLIC BLOOD PRESSURE: 86 MMHG

## 2018-06-08 DIAGNOSIS — J01.10 ACUTE FRONTAL SINUSITIS, RECURRENCE NOT SPECIFIED: Primary | ICD-10-CM

## 2018-06-08 DIAGNOSIS — R05.9 COUGH: ICD-10-CM

## 2018-06-08 RX ORDER — BENZONATATE 200 MG/1
200 CAPSULE ORAL
Qty: 21 CAP | Refills: 0 | Status: SHIPPED | OUTPATIENT
Start: 2018-06-08 | End: 2018-06-15

## 2018-06-08 RX ORDER — AZITHROMYCIN 250 MG/1
TABLET, FILM COATED ORAL
Qty: 6 TAB | Refills: 0 | Status: SHIPPED | OUTPATIENT
Start: 2018-06-08 | End: 2018-06-13

## 2018-06-08 NOTE — MR AVS SNAPSHOT
[facility] 
 
 
 [address] [address] [address] [facility] [address] 
[phone] Patient: Amari Silvestre 
MRN: [id number] Visit Information Date & Time Provider Department Dept. Phone Encounter #  
 2018  4:00 PM [facility] [phone] [id number] Follow-up Instructions Return if symptoms worsen or fail to improve. Upcoming Health Maintenance Date Due Influenza Age 5 to Adult 2018 PAP AKA CERVICAL CYTOLOGY 2020 DTaP/Tdap/Td series (3 - Td) 2027 Allergies as of 2018  Review Complete On: 2018 By: Mariangel Nelson PA-C No Known Allergies Current Immunizations  Reviewed on 2017 Name Date Influenza Nasal Vaccine 12/3/2014 Tdap 2017, 2005 Not reviewed this visit You Were Diagnosed With   
  
 Codes Comments Acute frontal sinusitis, recurrence not specified    -  Primary ICD-10-CM: J01.10 ICD-9-CM: 647.6 Cough     ICD-10-CM: R05 ICD-9-CM: 001. 2 Vitals BP Pulse Temp Resp Height(growth percentile) Weight(growth percentile) 125/86 (BP 1 Location: Left arm) 85 98.8 °F (37.1 °C) (Oral) 16 5' 8\" (1.727 m) 168 lb (76.2 kg) SpO2 BMI OB Status Smoking Status 98% 25.54 kg/m2 Menopause Former Smoker BMI and BSA Data Body Mass Index Body Surface Area 25.54 kg/m 2 1.91 m 2 Preferred Pharmacy Pharmacy Name Phone [facility] [address], [address] [place] [phone] Your Updated Medication List  
  
   
This list is accurate as of 18  4:57 PM.  Always use your most recent med list.  
  
  
  
  
 azithromycin 250 mg tablet Commonly known as:  Marlinda Tian Take 2 tablets today, then take 1 tablet daily  
  
 benzonatate 200 mg capsule Commonly known as:  TESSALON Take 1 Cap by mouth three (3) times daily as needed for Cough for up to 7 days. Indications: Cough  
  
 codeine kaylee-chlorphenir kaylee 14.7-2.8 mg/5 mL Su12 Commonly known as:  TUZISTRA XR Take 10 mL by mouth two (2) times a day. Max Daily Amount: 20 mL. Indications: Cough  
  
 fluticasone 50 mcg/actuation nasal spray Commonly known as:  Star Serve 2 Sprays by Both Nostrils route daily. Prescriptions Printed Refills  
 codeine kaylee-chlorphenir kaylee (TUZISTRA XR) 14.7-2.8 mg/5 mL su12 0 Sig: Take 10 mL by mouth two (2) times a day. Max Daily Amount: 20 mL. Indications: Cough Class: Print Route: Oral  
  
Prescriptions Sent to Pharmacy Refills  
 azithromycin (ZITHROMAX) 250 mg tablet 0 Sig: Take 2 tablets today, then take 1 tablet daily Class: Normal  
 [id number] [address] [place] [address] Ph #: [phone]  
 benzonatate (TESSALON) 200 mg capsule 0 Sig: Take 1 Cap by mouth three (3) times daily as needed for Cough for up to 7 days. Indications: Cough Class: Normal  
 [facility]acy: [id number] Ibrahim, [address] [place] [address] Ph #: [phone] Route: Oral  
  
Follow-up Instructions Return if symptoms worsen or fail to improve. Patient Instructions A Healthy Lifestyle: Care Instructions Your Care Instructions A healthy lifestyle can help you feel good, stay at a healthy weight, and have plenty of energy for both work and play. A healthy lifestyle is something you can share with your whole family. A healthy lifestyle also can lower your risk for serious health problems, such as high blood pressure, heart disease, and diabetes. You can follow a few steps listed below to improve your health and the health of your family. Follow-up care is a key part of your treatment and safety. Be sure to make and go to all appointments, and call your doctor if you are having problems.  It's also a good idea to know your test results and keep a list of the medicines you take. How can you care for yourself at home? · Do not eat too much sugar, fat, or fast foods. You can still have dessert and treats now and then. The goal is moderation. · Start small to improve your eating habits. Pay attention to portion sizes, drink less juice and soda pop, and eat more fruits and vegetables. ¨ Eat a healthy amount of food. A 3-ounce serving of meat, for example, is about the size of a deck of cards. Fill the rest of your plate with vegetables and whole grains. ¨ Limit the amount of soda and sports drinks you have every day. Drink more water when you are thirsty. ¨ Eat at least 5 servings of fruits and vegetables every day. It may seem like a lot, but it is not hard to reach this goal. A serving or helping is 1 piece of fruit, 1 cup of vegetables, or 2 cups of leafy, raw vegetables. Have an apple or some carrot sticks as an afternoon snack instead of a candy bar. Try to have fruits and/or vegetables at every meal. 
· Make exercise part of your daily routine. You may want to start with simple activities, such as walking, bicycling, or slow swimming. Try to be active 30 to 60 minutes every day. You do not need to do all 30 to 60 minutes all at once. For example, you can exercise 3 times a day for 10 or 20 minutes. Moderate exercise is safe for most people, but it is always a good idea to talk to your doctor before starting an exercise program. 
· Keep moving. Sandra Banner the lawn, work in the garden, or [employer]. Take the stairs instead of the elevator at work. · If you smoke, quit. People who smoke have an increased risk for heart attack, stroke, cancer, and other lung illnesses. Quitting is hard, but there are ways to boost your chance of quitting tobacco for good. ¨ Use nicotine gum, patches, or lozenges. ¨ Ask your doctor about stop-smoking programs and medicines. ¨ Keep trying.  
In addition to reducing your risk of diseases in the future, you will notice some benefits soon after you stop using tobacco. If you have shortness of breath or asthma symptoms, they will likely get better within a few weeks after you quit. · Limit how much alcohol you drink. Moderate amounts of alcohol (up to 2 drinks a day for men, 1 drink a day for women) are okay. But drinking too much can lead to liver problems, high blood pressure, and other health problems. Family health If you have a family, there are many things you can do together to improve your health. · Eat meals together as a family as often as possible. · Eat healthy foods. This includes fruits, vegetables, lean meats and dairy, and whole grains. · Include your family in your fitness plan. Most people think of activities such as jogging or tennis as the way to fitness, but there are many ways you and your family can be more active. Anything that makes you breathe hard and gets your heart pumping is exercise. Here are some tips: 
¨ Walk to do errands or to take your child to school or the bus. ¨ Go for a family bike ride after dinner instead of watching TV. Where can you learn more? Go to http://robinsonNDSSI Holdingsepte.info/. Enter C553 in the search box to learn more about \"A Healthy Lifestyle: Care Instructions. \" Current as of: May 12, 2017 Content Version: 11.4 © 9866-8812 [employer]. Care instructions adapted under license by [employer] (which disclaims liability or warranty for this information). If you have questions about a medical condition or this instruction, always ask your healthcare professional. [place] [address] any warranty or liability for your use of this information. Sinusitis: Care Instructions Your Care Instructions Sinusitis is an infection of the lining of the sinus cavities in your head. Sinusitis often follows a cold. It causes pain and pressure in your head and face. In most cases, sinusitis gets better on its own in 1 to 2 weeks. But some mild symptoms may last for several weeks. Sometimes antibiotics are needed. Follow-up care is a key part of your treatment and safety. Be sure to make and go to all appointments, and call your doctor if you are having problems. It's also a good idea to know your test results and keep a list of the medicines you take. How can you care for yourself at home? · Take an over-the-counter pain medicine, such as acetaminophen (Tylenol), ibuprofen (Advil, Motrin), or naproxen (Aleve). Read and follow all instructions on the label. · If the doctor prescribed antibiotics, take them as directed. Do not stop taking them just because you feel better. You need to take the full course of antibiotics. · Be careful when taking over-the-counter cold or flu medicines and Tylenol at the same time. Many of these medicines have acetaminophen, which is Tylenol. Read the labels to make sure that you are not taking more than the recommended dose. Too much acetaminophen (Tylenol) can be harmful. · Breathe warm, moist air from a steamy shower, a hot bath, or a sink filled with hot water. Avoid cold, dry air. Using a humidifier in your home may help. Follow the directions for cleaning the machine. · Use saline (saltwater) nasal washes to help keep your nasal passages open and wash out mucus and bacteria. You can buy saline nose drops at a grocery store or drugstore. Or you can make your own at home by adding 1 teaspoon of salt and 1 teaspoon of baking soda to 2 cups of distilled water. If you make your own, fill a bulb syringe with the solution, insert the tip into your nostril, and squeeze gently. Aubery Neat your nose. · Put a hot, wet towel or a warm gel pack on your face 3 or 4 times a day for 5 to 10 minutes each time. · Try a decongestant nasal spray like oxymetazoline (Afrin).  Do not use it for more than 3 days in a row. Using it for more than 3 days can make your congestion worse. When should you call for help? Call your doctor now or seek immediate medical care if: 
? · You have new or worse swelling or redness in your face or around your eyes. ? · You have a new or higher fever. ? Watch closely for changes in your health, and be sure to contact your doctor if: 
? · You have new or worse facial pain. ? · The mucus from your nose becomes thicker (like pus) or has new blood in it. ? · You are not getting better as expected. Where can you learn more? Go to http://robinson-pete.info/. Enter Z046 in the search box to learn more about \"Sinusitis: Care Instructions. \" Current as of: May 12, 2017 Content Version: 11.4 © 1138-3794 [employer]aisa [employer] Anytime | Anywhere. Care instructions adapted under license by emotion.me (which disclaims liability or warranty for this information). If you have questions about a medical condition or this instruction, always ask your healthcare professional. [address] any warranty or liability for your use of this information. May take in addition to prescribed medications if needed for cough: 
 
mucinex (guaifenesin) 1200mg twice daily with plenty of water. Sudafed for sinus congestion Ibuprofen for sinus pain and headache. Introducing [facility]! Armando Vaughan introduces GoodApril patient portal. Now you can access parts of your medical record, email your doctor's office, and request medication refills online. 1. In your internet browser, go to [email or web address] 2. Click on the First Time User? Click Here link in the Sign In box. You will see the New Member Sign Up page. 3. Enter your GoodApril Access Code exactly as it appears below. You will not need to use this code after youve completed the sign-up process.  If you do not sign up before the expiration date, you must request a new code. · Ambassador Access Code: [facility] Expires: 6/24/2018 10:28 AM 
 
4. Enter the last four digits of your Social Security Number (xxxx) and Date of Birth (mm/dd/yyyy) as indicated and click Submit. You will be taken to the next sign-up page. 5. Create a Ambassador ID. This will be your Ambassador login ID and cannot be changed, so think of one that is secure and easy to remember. 6. Create a Ambassador password. You can change your password at any time. 7. Enter your Password Reset Question and Answer. This can be used at a later time if you forget your password. 8. Enter your e-mail address. You will receive e-mail notification when new information is available in [address] 9. Click Sign Up. You can now view and download portions of your medical record. 10. Click the Download Summary menu link to download a portable copy of your medical information. If you have questions, please visit the Frequently Asked Questions section of the Ambassador website. Remember, Ambassador is NOT to be used for urgent needs. For medical emergencies, dial 911. Now available from your iPhone and Android! Please provide this summary of care documentation to your next provider. Your primary care clinician is listed as Ector Bear. If you have any questions after today's visit, please call [phone].

## 2018-06-08 NOTE — PROGRESS NOTES
CC:  cough    HPI:  Catarina Orellana is a 50 y.o. female   who presents today with complaints of cough:  productive of  white. and chest pain or soreness , sore throat : which increases with swallowing , sinus pain:  sinus drainage and paranasal sinus pressure , subjective fever  and chills  for 2 days. She tried ibuprofen with slight relief of symptoms. She can  attribute to exposure, her son had strep last week and she is concerned she has it too. The patient is a  E-cigarette smoker. She denies SOB, wheezing or dyspnea. The patient also complains of headache, when she leans head forward it increases pain. ROS: As pertinent in HPI. PHYSICAL EXAM:    Visit Vitals    /86 (BP 1 Location: Left arm)    Pulse 85    Temp 98.8 °F (37.1 °C) (Oral)    Resp 16    Ht 5' 8\" (1.727 m)    Wt 168 lb (76.2 kg)    SpO2 98%    BMI 25.54 kg/m2       Physical Exam   Constitutional: She appears well-developed and well-nourished. HENT:   Head: Normocephalic and atraumatic. Right Ear: External ear normal.   Left Ear: External ear normal.   Nose: Nose normal.   Mouth/Throat: Uvula is midline. Posterior oropharyngeal erythema (mild) present. No oropharyngeal exudate or posterior oropharyngeal edema. Neck: Normal range of motion. Neck supple. No thyromegaly present. Cardiovascular: Normal rate, regular rhythm and normal heart sounds. Pulmonary/Chest: Effort normal and breath sounds normal.   Lymphadenopathy:     She has cervical adenopathy (anterior). Vitals reviewed. Assessment and Plan:  Patient educated most likely not strep due to cough, afebrile, no exudates but patient would like testing. Strep testing is negative. Diagnoses and all orders for this visit:    1. Acute frontal sinusitis, recurrence not specified  -     azithromycin (ZITHROMAX) 250 mg tablet; Take 2 tablets today, then take 1 tablet daily    2.  Cough  -     codeine kaylee-chlorphenir kaylee (TUZISTRA XR) 14.7-2.8 mg/5 mL su12; Take 10 mL by mouth two (2) times a day. Max Daily Amount: 20 mL. Indications: Cough  -     benzonatate (TESSALON) 200 mg capsule; Take 1 Cap by mouth three (3) times daily as needed for Cough for up to 7 days. Indications: Cough    tessalon during day, tuzistra at night with no unisom or antihistamine addition. guaifenesin 1200mg recommended twice daily with plenty of water, may take sudafed and ibuprofen if needed. Medication and side effects, including risk and signs of allergy were discussed. Patient given printed information with diagnoses and medication information. Answered all questions and patient acknowledged understanding. Patient agrees to follow up with PCP prn  or sooner if symptoms worsen. Hilaria Kyle MPA, PA-C  [facility]        I reviewed the patient's medical history, the physician assistant's findings on physical examination, the patient's diagnoses, and treatment plan as documented in the progress note. I concur with the treatment plan as documented. There are no additional recommendations at this time.     Aissatou Koch MD          .

## 2018-06-08 NOTE — PATIENT INSTRUCTIONS
A Healthy Lifestyle: Care Instructions  Your Care Instructions    A healthy lifestyle can help you feel good, stay at a healthy weight, and have plenty of energy for both work and play. A healthy lifestyle is something you can share with your whole family. A healthy lifestyle also can lower your risk for serious health problems, such as high blood pressure, heart disease, and diabetes. You can follow a few steps listed below to improve your health and the health of your family. Follow-up care is a key part of your treatment and safety. Be sure to make and go to all appointments, and call your doctor if you are having problems. It's also a good idea to know your test results and keep a list of the medicines you take. How can you care for yourself at home? · Do not eat too much sugar, fat, or fast foods. You can still have dessert and treats now and then. The goal is moderation. · Start small to improve your eating habits. Pay attention to portion sizes, drink less juice and soda pop, and eat more fruits and vegetables. ¨ Eat a healthy amount of food. A 3-ounce serving of meat, for example, is about the size of a deck of cards. Fill the rest of your plate with vegetables and whole grains. ¨ Limit the amount of soda and sports drinks you have every day. Drink more water when you are thirsty. ¨ Eat at least 5 servings of fruits and vegetables every day. It may seem like a lot, but it is not hard to reach this goal. A serving or helping is 1 piece of fruit, 1 cup of vegetables, or 2 cups of leafy, raw vegetables. Have an apple or some carrot sticks as an afternoon snack instead of a candy bar. Try to have fruits and/or vegetables at every meal.  · Make exercise part of your daily routine. You may want to start with simple activities, such as walking, bicycling, or slow swimming. Try to be active 30 to 60 minutes every day. You do not need to do all 30 to 60 minutes all at once.  For example, you can exercise 3 times a day for 10 or 20 minutes. Moderate exercise is safe for most people, but it is always a good idea to talk to your doctor before starting an exercise program.  · Keep moving. Tobias Em the lawn, work in the garden, or [employer]. Take the stairs instead of the elevator at work. · If you smoke, quit. People who smoke have an increased risk for heart attack, stroke, cancer, and other lung illnesses. Quitting is hard, but there are ways to boost your chance of quitting tobacco for good. ¨ Use nicotine gum, patches, or lozenges. ¨ Ask your doctor about stop-smoking programs and medicines. ¨ Keep trying. In addition to reducing your risk of diseases in the future, you will notice some benefits soon after you stop using tobacco. If you have shortness of breath or asthma symptoms, they will likely get better within a few weeks after you quit. · Limit how much alcohol you drink. Moderate amounts of alcohol (up to 2 drinks a day for men, 1 drink a day for women) are okay. But drinking too much can lead to liver problems, high blood pressure, and other health problems. Family health  If you have a family, there are many things you can do together to improve your health. · Eat meals together as a family as often as possible. · Eat healthy foods. This includes fruits, vegetables, lean meats and dairy, and whole grains. · Include your family in your fitness plan. Most people think of activities such as jogging or tennis as the way to fitness, but there are many ways you and your family can be more active. Anything that makes you breathe hard and gets your heart pumping is exercise. Here are some tips:  ¨ Walk to do errands or to take your child to school or the bus. ¨ Go for a family bike ride after dinner instead of watching TV. Where can you learn more? Go to http://robinson-pete.info/. Enter P560 in the search box to learn more about \"A Healthy Lifestyle: Care Instructions. \"  Current as of: May 12, 2017  Content Version: 11.4  © 4719-8859 Ar[employer]. Care instructions adapted under license by [employer] (which disclaims liability or warranty for this information). If you have questions about a medical condition or this instruction, always ask your healthcare professional. [address] any warranty or liability for your use of this information. Sinusitis: Care Instructions  Your Care Instructions    Sinusitis is an infection of the lining of the sinus cavities in your head. Sinusitis often follows a cold. It causes pain and pressure in your head and face. In most cases, sinusitis gets better on its own in 1 to 2 weeks. But some mild symptoms may last for several weeks. Sometimes antibiotics are needed. Follow-up care is a key part of your treatment and safety. Be sure to make and go to all appointments, and call your doctor if you are having problems. It's also a good idea to know your test results and keep a list of the medicines you take. How can you care for yourself at home? · Take an over-the-counter pain medicine, such as acetaminophen (Tylenol), ibuprofen (Advil, Motrin), or naproxen (Aleve). Read and follow all instructions on the label. · If the doctor prescribed antibiotics, take them as directed. Do not stop taking them just because you feel better. You need to take the full course of antibiotics. · Be careful when taking over-the-counter cold or flu medicines and Tylenol at the same time. Many of these medicines have acetaminophen, which is Tylenol. Read the labels to make sure that you are not taking more than the recommended dose. Too much acetaminophen (Tylenol) can be harmful. · Breathe warm, moist air from a steamy shower, a hot bath, or a sink filled with hot water. Avoid cold, dry air. Using a humidifier in your home may help. Follow the directions for cleaning the machine.   · Use saline (saltwater) nasal washes to help keep your nasal passages open and wash out mucus and bacteria. You can buy saline nose drops at a grocery store or drugstore. Or you can make your own at home by adding 1 teaspoon of salt and 1 teaspoon of baking soda to 2 cups of distilled water. If you make your own, fill a bulb syringe with the solution, insert the tip into your nostril, and squeeze gently. Cuellar Spurr your nose. · Put a hot, wet towel or a warm gel pack on your face 3 or 4 times a day for 5 to 10 minutes each time. · Try a decongestant nasal spray like oxymetazoline (Afrin). Do not use it for more than 3 days in a row. Using it for more than 3 days can make your congestion worse. When should you call for help? Call your doctor now or seek immediate medical care if:  ? · You have new or worse swelling or redness in your face or around your eyes. ? · You have a new or higher fever. ? Watch closely for changes in your health, and be sure to contact your doctor if:  ? · You have new or worse facial pain. ? · The mucus from your nose becomes thicker (like pus) or has new blood in it. ? · You are not getting better as expected. Where can you learn more? Go to http://robinson-pete.info/. Enter O221 in the search box to learn more about \"Sinusitis: Care Instructions. \"  Current as of: May 12, 2017  Content Version: 11.4  © 4470-0302 [employer]. Care instructions adapted under license by [employer] (which disclaims liability or warranty for this information). If you have questions about a medical condition or this instruction, always ask your healthcare professional. [place] [address] any warranty or liability for your use of this information. May take in addition to prescribed medications if needed for cough:    mucinex (guaifenesin) 1200mg twice daily with plenty of water. Sudafed for sinus congestion    Ibuprofen for sinus pain and headache.

## 2018-06-08 NOTE — PROGRESS NOTES
Pt is here for cough, congestion & headache x 3 days    1. Have you been to the ER, urgent care clinic since your last visit? Hospitalized since your last visit? no    2. Have you seen or consulted any other health care providers outside of the [facility] since your last visit? Include any pap smears or colon screening. Yes, PT but unknown practice name.

## 2018-07-05 ENCOUNTER — TELEPHONE (OUTPATIENT)
Dept: FAMILY MEDICINE CLINIC | Age: 48
End: 2018-07-05

## 2018-07-09 ENCOUNTER — OFFICE VISIT (OUTPATIENT)
Dept: FAMILY MEDICINE CLINIC | Age: 48
End: 2018-07-09

## 2018-07-09 VITALS
TEMPERATURE: 98.4 F | BODY MASS INDEX: 25.01 KG/M2 | DIASTOLIC BLOOD PRESSURE: 66 MMHG | HEART RATE: 86 BPM | HEIGHT: 68 IN | RESPIRATION RATE: 14 BRPM | SYSTOLIC BLOOD PRESSURE: 98 MMHG | OXYGEN SATURATION: 98 % | WEIGHT: 165 LBS

## 2018-07-09 DIAGNOSIS — G47.00 INSOMNIA, UNSPECIFIED TYPE: ICD-10-CM

## 2018-07-09 DIAGNOSIS — R63.5 WEIGHT GAIN: ICD-10-CM

## 2018-07-09 DIAGNOSIS — Z13.29 SCREENING FOR THYROID DISORDER: ICD-10-CM

## 2018-07-09 DIAGNOSIS — Z13.220 SCREENING CHOLESTEROL LEVEL: ICD-10-CM

## 2018-07-09 DIAGNOSIS — Z01.419 WELL WOMAN EXAM WITH ROUTINE GYNECOLOGICAL EXAM: ICD-10-CM

## 2018-07-09 DIAGNOSIS — Z00.00 WELL WOMAN EXAM (NO GYNECOLOGICAL EXAM): Primary | ICD-10-CM

## 2018-07-09 RX ORDER — TRAZODONE HYDROCHLORIDE 50 MG/1
50-100 TABLET ORAL
Qty: 60 TAB | Refills: 0 | Status: SHIPPED | OUTPATIENT
Start: 2018-07-09 | End: 2018-11-01

## 2018-07-09 RX ORDER — IBUPROFEN 200 MG
400 TABLET ORAL AS NEEDED
COMMUNITY

## 2018-07-09 NOTE — MR AVS SNAPSHOT
[address] 
[phone] Patient: Tariq Mistry 
MRN: [id number] Visit Information Date & Time Provider Department Dept. Phone Encounter #  
 7/9/2018 10:45 AM Shaun Rock, [address] [place] [address] [id number] Follow-up Instructions Return in about 1 year (around 7/9/2019) for physical.  Fasting labs due at your covenience . Upcoming Health Maintenance Date Due Influenza Age 5 to Adult 8/1/2018 PAP AKA CERVICAL CYTOLOGY 4/28/2020 DTaP/Tdap/Td series (3 - Td) 6/5/2027 Allergies as of 7/9/2018  Review Complete On: 7/9/2018 By: Shaun Rock MD  
 No Known Allergies Current Immunizations  Reviewed on 6/5/2017 Name Date Influenza Nasal Vaccine 12/3/2014 Tdap 6/5/2017, 1/1/2005 Not reviewed this visit You Were Diagnosed With   
  
 Codes Comments Well woman exam (no gynecological exam)    -  Primary ICD-10-CM: Z00.00 ICD-9-CM: V70.0 [V70.0] Well woman exam with routine gynecological exam     ICD-10-CM: T45.416 ICD-9-CM: V72.31 [V72.31] Weight gain     ICD-10-CM: R63.5 ICD-9-CM: 783.1 Screening for thyroid disorder     ICD-10-CM: Z13.29 ICD-9-CM: V77.0 Screening cholesterol level     ICD-10-CM: A17.782 ICD-9-CM: V77.91 Insomnia, unspecified type     ICD-10-CM: G47.00 ICD-9-CM: 780.52 Vitals BP Pulse Temp Resp Height(growth percentile) Weight(growth percentile) 98/66 (BP 1 Location: Left arm, BP Patient Position: Sitting) 86 98.4 °F (36.9 °C) (Oral) 14 5' 8\" (1.727 m) 165 lb (74.8 kg) SpO2 BMI OB Status Smoking Status 98% 25.09 kg/m2 Menopause Former Smoker Vitals History BMI and BSA Data Body Mass Index Body Surface Area 25.09 kg/m 2 1.89 m 2 Preferred Pharmacy Pharmacy Name Phone [facility], [address] [phone] Your Updated Medication List  
  
   
This list is accurate as of 7/9/18 11:34 AM.  Always use your most recent med list.  
  
  
  
  
 fluticasone 50 mcg/actuation nasal spray Commonly known as:  Filbert Chard 2 Sprays by Both Nostrils route daily. MOTRIN  mg tablet Generic drug:  ibuprofen Take 400 mg by mouth as needed for Pain. traZODone 50 mg tablet Commonly known as:  Berniece Staggers Take 1-2 Tabs by mouth nightly. UNISOM (DIPHENHYDRAMINE) PO Take 2 Tabs by mouth nightly. Prescriptions Sent to Pharmacy Refills  
 traZODone (DESYREL) 50 mg tablet 0 Sig: Take 1-2 Tabs by mouth nightly. Class: Normal  
 Pharmacy: [id number], [address] Ph #: [phone] Route: Oral  
  
We Performed the Following REFERRAL TO OBSTETRICS AND GYNECOLOGY [REF51 Custom] Follow-up Instructions Return in about 1 year (around 7/9/2019) for physical.  [facility] To-Do List   
 07/09/2018 Lab:  CBC WITH AUTOMATED DIFF   
  
 07/09/2018 Lab:  LIPID PANEL   
  
 07/09/2018 Lab:  METABOLIC PANEL, COMPREHENSIVE   
  
 07/09/2018 Lab:  TSH 3RD GENERATION Referral Information Referral ID Referred By Referred To  
  
 [id number] Marcus Andrade Not Available Visits Status Start Date End Date 1 New Request 7/9/18 7/9/19 If your referral has a status of pending review or denied, additional information will be sent to support the outcome of this decision. Patient Instructions Consultas para idades entre 18 e 48 anos: Instruções de cuidados - [ Well Visit, Ages 25 to 48: Care Instructions ] Suas instruções de cuidados Testes físicos podem ajudá-lo a se manter saudável.  O médico verificou sua saúde brenna e pode ter sugerido maneiras de se cuidar bem. [place] também pode ter recomendado exames. Em casa, você pode ajudar a prevenir doenças por meio de carla dieta saudável, exercícios regulares e outras medidas. O acompanhamento é parte crucial do seu tratamento e Karen Alstrom. Moses Carbine a todas as consultas de acompanhamento, e ligue para o médico se você apresentar algum problema. Também é carla boa ideia saber os resultados de seus exames, e guardar carla [employer] que você jose g. [place] cuidar de si mesmo em casa? · Alcance e a mantenha um peso saudável. Isso diminuirá seu risco de desenvolver muitos problemas, chantal obesidade, diabetes, doença cardiovascular e pressão elli. · Faça pelo menos 30 minutos de atividades físicas na maior parte dos rodriguez da [place]. Caminhada é carla boa opção. Você pode querer fazer [employer], chantal corrida, natação, ciclismo, tênis ou um esporte coletivo. Discuta possíveis alterações em seu programa de exercícios com seu médico. 
· Não fume ou permita que outros fumem ao seu redor. Se precisar de ajuda para parar de fumar, fale com seu médico sobre programas e medicamentos antitabagismo. Isso pode aumentar sua chance de parar definitivamente de fumar. · [place] com o médico sobre se você possui algum fator de risco para infecções sexualmente transmissíveis (ISTs). Ter um parceiro sexual (que não possua ISTs e que não faça sexo com mais ninguém) é carla boa maneira de evitar angie infecções. · Use métodos contraceptivos se não quiser ter filhos no momento. Josephine com seu [employer] as opções disponíveis e o que pode ser melhor para você. · Sempre use filtro solar na porção da pele exposta ao sol. O filtro solar deve bloquear raios ultravioletas (tanto UVA chantal UVB) e ter um fator de proteção solar (FPS) de pelo menos 15. Use-o diariamente, mesmo quando estiver nublado. Alguns médicos podem recomendar um FPS maior, chantal o 30. · Vá ao dentista carla ou duas vezes ao ano, para check-ups e para fazer [employer]. · Use o juarez de segurança no tomás. · Zuleyka álcool com moderação, donny goste de beber. Isso significa não mais de 2 copos por santana para os homens e 1 para as mulheres. Siga o conselho de seu médico em relação às datas de realização de determinados exames. Melanie exames podem detectar problemas precocemente. Para todos · Colesterol. Faça exames de sangue para verificar o nível do gordura (colesterol) no corpo após completar 20 anos. O médico dirá a frequência com que você deve realizar o exame, com base em sua idade, histórico familiar ou outros fatores que podem aumentar seu risco de desenvolver doença cardiovascular. · Pressão sanguínea. Meça sua pressão sempre que for [employer]. O médico dirá a frequência com que você deve medir a pressão, com base em sua idade, os resultados de seu teste de pressão e outros fatores. · Visão. Pergunte ao médico com que frequência você deve fazer exames para detecção de glaucoma. · Diabetes. Pergunte ao médico se você deve fazer exames para detecção de diabetes. · Câncer colorretal.Faça um exame para detecção de câncer colorretal aos 50 anos de idade. Você pode fazer um dentre diversos exames. Se tiver menos de 50 anos, você pode precisar realizar o exame antecipadamente, donny possua fatores de risco. Fatores de risco incluem o fato de você já ter removido pólipos pré-cancerígenos do cólon ou se algum de seus lake, irmãos, irmãs ou filhos já teve câncer colorretal. 
Para mulheres · [place]. Pergunte ao médico quando você deve fazer um exame [place] e carla Reji. Especialistas divergem sobre o momento e a frequência de realização desses exames para mulheres com menos de 50 anos. Seu médico poderá ajudá-la a decidir o que é melhor para você. · Teste de Papanicolau e exame pélvico.Comece a fazer testes de Papanicolau aos 21 anos. Um teste de Papanicolau é a melhor maneira de encontrar câncer cervical. O teste é normalmente parte de um exame pélvico. Pergunte ao médico com que frequência você deve realizar o teste. · Testes para detecção de infecções sexualmente transmissíveis (ISTs). Pergunte ao médico quando você deve realizar exames para detecção de ISTs. Você pode estar correndo risco se angel sexo com mais de carla lidya, principalmente se seus parceiros não usam camisinha. Para homens · Testes para detecção de infecções sexualmente transmissíveis (ISTs). Pergunte ao médico quando você deve realizar exames para detecção de ISTs. Você pode estar correndo risco se angel sexo com mais de carla lidya, principalmente se você não [place] camisinha. · Exame do câncer de testículo. Pergunte ao médico se você deve verificar seus testículos regularmente. · Exame de próstata. Pergunte ao médico se você deve fazer um exame de sangue (chamado exame PSA) para câncer de próstata. Os especialistas divergem quanto ao momento e frequência com que os homens devem realizar trent teste. Alguns sugerem a quem tem 39 anos ou mais e é afrodescendente ou tem um irmão ou graham que teve câncer de próstata com menos de 65 anos. Quando você deve pedir ajuda? Esteja atento a quaisquer alterações à sua saúde e certifique-se de entrar em contato com seu médico se desenvolver qualquer problema ou sintoma passível de preocupação. Onde você pode aprender mais? Acesse [email or web address].woods.com/. Digite o P072 Na caixa de busca para saber mais sobre \"Consultas para idades entre 18 e 50 anos: Instruções de cuidados - [ Well Visit, Ages 25 to 48: Care Instructions ]. \" Na data de: 16 maio, 2017 Versão de conteúdo: 11.4 © 1529-6282 Healthwise, [employer]. Instruções de cuidados adaptadas sob licença de seu profissional da saúde. Se você tem dúvidas sobre um estado clínico ou essas instruções, sempre pergunte ao seu profissional da saúde. A Healthwise, Incorporated renúncia a toda e qualquer garantia ou responsabilidade por seu uso dessas informações. Introducing [address]! [facility] introduces [employer] patient portal. Now you can access parts of your medical record, email your doctor's office, and request medication refills online. 1. In your internet browser, go to [email or web address] 2. Click on the First Time User? Click Here link in the Sign In box. You will see the New Member Sign Up page. 3. Enter your [employer] Access Code exactly as it appears below. You will not need to use this code after youve completed the sign-up process. If you do not sign up before the expiration date, you must request a new code. · [employer] Access Code: [email or web address] Expires: 10/7/2018 11:34 AM 
 
4. Enter the last four digits of your Social Security Number (xxxx) and Date of Birth (mm/dd/yyyy) as indicated and click Submit. You will be taken to the next sign-up page. 5. Create a [employer] ID. This will be your [employer] login ID and cannot be changed, so think of one that is secure and easy to remember. 6. Create a [employer] password. You can change your password at any time. 7. Enter your Password Reset Question and Answer. This can be used at a later time if you forget your password. 8. Enter your e-mail address. You will receive e-mail notification when new information is available in [address] 9. Click Sign Up. You can now view and download portions of your medical record. 10. Click the Download Summary menu link to download a portable copy of your medical information. If you have questions, please visit the Frequently Asked Questions section of the [employer] website. Remember, [employer] is NOT to be used for urgent needs. For medical emergencies, dial 911. Now available from your iPhone and Android! Please provide this summary of care documentation to your next provider. Your primary care clinician is listed as Che Mckay. If you have any questions after today's visit, please call [phone].

## 2018-07-09 NOTE — PROGRESS NOTES
Chief Complaint   Patient presents with   [employer] Referral Request     OB/GYN at [facility] (Dr. Darrel Moon) for annual pap smear    Other     patient requesting lab order for annual lab panel      Subjective:   50 y.o. female for Well Woman Check. Her gyne and breast care is done elsewhere by her Ob-Gyne physician. Current Outpatient Prescriptions   Medication Sig Dispense Refill    diphenhydramine HCl (UNISOM, DIPHENHYDRAMINE, PO) Take 2 Tabs by mouth nightly.  ibuprofen (MOTRIN IB) 200 mg tablet Take 400 mg by mouth as needed for Pain.  traZODone (DESYREL) 50 mg tablet Take 1-2 Tabs by mouth nightly. 60 Tab 0    fluticasone (FLONASE) 50 mcg/actuation nasal spray 2 Sprays by Both Nostrils route daily. (Patient taking differently: 2 Sprays by Both Nostrils route daily as needed.) 1 Bottle 2     No Known Allergies  Past Medical History:   Diagnosis Date    Endometriosis     Fibroids 2008    s/p ablation 08, takes naproxen regularly for menstrual cramping    Genital HSV 2014    Shoulder disorder 2016    left, rtc tendinosis, bursitis, AC arthritis, labral pathology     Past Surgical History:   Procedure Laterality Date    HX OTHER SURGICAL  approx 2007    fibroid embolization      Family History   Problem Relation Age of Onset    Hypertension Mother     Heart Disease Mother     High Cholesterol Mother     Hypertension Father     High Cholesterol Father     Other Father      elevated PSA, monitoring     Social History   Substance Use Topics    Smoking status: Former Smoker     Packs/day: 0.50     Years: 10.00     Types: Cigarettes     Quit date: 1/1/2014    Smokeless tobacco: Never Used    Alcohol use Yes      Comment: \"occasional\"      ROS: Feeling generally well. No TIA's or unusual headaches, no dysphagia. No prolonged cough. No dyspnea or chest pain on exertion. No abdominal pain, change in bowel habits, black or bloody stools. No urinary tract symptoms.   No new or unusual musculoskeletal symptoms. Specific concerns today:   Requesting thyroid test due to inability to lose weight and actual recent weight gain. The patient presents complaining of a long history of difficulty sleeping. The patient states it is difficult to initiate sleep. The patient states that once they fall asleep they are able to maintain a restful sleep. The patient denies daytime somnolence. We reviewed the patients usual sleep routine. The patient denies any history of loud snoring. The patient is taking Unisom with some relief. Was on Lunesta in the past as a trial by another physician long ago. Objective: The patient appears well, alert, oriented x 3, in no distress. Visit Vitals    BP 98/66 (BP 1 Location: Left arm, BP Patient Position: Sitting)    Pulse 86    Temp 98.4 °F (36.9 °C) (Oral)    Resp 14    Ht 5' 8\" (1.727 m)    Wt 165 lb (74.8 kg)    SpO2 98%    BMI 25.09 kg/m2     ENT normal.  Neck supple. No adenopathy or thyromegaly. LEANNE. Lungs are clear, good air entry, no wheezes, rhonchi or rales. S1 and S2 normal, no murmurs, regular rate and rhythm. Abdomen soft without tenderness, guarding, mass or organomegaly. Extremities show no edema, normal peripheral pulses. Neurological is normal, no focal findings. Breast and Pelvic exams are deferred. Assessment/Plan:       ICD-10-CM ICD-9-CM    1. Well woman exam (no gynecological exam) Z00.00 V70.0     [V70.0]   2. Well woman exam with routine gynecological exam Z01.419 V72.31 REFERRAL TO OBSTETRICS AND GYNECOLOGY    [V72.31]   3. Weight gain R63.5 783.1 CBC WITH AUTOMATED DIFF      METABOLIC PANEL, COMPREHENSIVE      TSH 3RD GENERATION   4. Screening for thyroid disorder Z13.29 V77.0 TSH 3RD GENERATION   5. Screening cholesterol level Z13.220 V77.91 LIPID PANEL   6. Insomnia, unspecified type G47.00 780.52 traZODone (DESYREL) 50 mg tablet     Age and sex specific counseling. Encourage diet and exercise.     Screening fasting labs. Refer to OB/GYN for WWE. Trial of trazodone 50-100mg nightly for insomnia instead of Unisom. All chart history elements were reviewed by me at the time of the visit even though marked at time of note closure. Patient understands our medical plan. Patient has provided input and agrees with goals. Alternatives have been explained and offered. All questions answered. The patient is to call if condition worsens or fails to improve. Follow-up Disposition:  Return in about 1 year (around 7/9/2019) for physical.  Fasting labs due at your covenience .

## 2018-07-09 NOTE — PATIENT INSTRUCTIONS
Consultas para idades entre 18 e 48 anos: Instruções de cuidados - [ Well Visit, Ages 25 to 48: Care Instructions ]  Suas instruções de cuidados  Testes físicos podem ajudá-lo a se manter saudável. O médico verificou sua saúde brenna e pode ter sugerido maneiras de se cuidar bem. [place] também pode ter recomendado exames. Em casa, você pode ajudar a prevenir doenças por meio de carla dieta saudável, exercícios regulares e outras medidas. O acompanhamento é parte crucial do seu tratamento e Tara Suri. Paula Melena a todas as consultas de acompanhamento, e ligue para o médico se você apresentar algum problema. Também é carla boa ideia saber os resultados de seus exames, e guardar carla [employer] que você jose g. Claude cuidar de si mesmo em casa? · Alcance e a mantenha um peso saudável. Isso diminuirá seu risco de desenvolver muitos problemas, claude obesidade, diabetes, doença cardiovascular e pressão elli. · Faça pelo menos 30 minutos de atividades físicas na maior parte dos rodriguez da [place]. Caminhada é carla boa opção. Você pode querer fazer [place] [employer], claude corrida, natação, ciclismo, tênis ou um esporte coletivo. Discuta possíveis alterações em seu programa de exercícios com seu médico.  · Não fume ou permita que outros fumem ao seu redor. Se precisar de ajuda para parar de fumar, fale com seu médico sobre programas e medicamentos antitabagismo. Isso pode aumentar sua chance de parar definitivamente de fumar. · [place] com o médico sobre se você possui algum fator de risco para infecções sexualmente transmissíveis (ISTs). Ter um parceiro sexual (que não possua ISTs e que não faça sexo com mais ninguém) é carla boa maneira de evitar angie infecções. · Use métodos contraceptivos se não quiser ter filhos no momento. [place] com seu [employer] as opções disponíveis e o que pode ser melhor para você. · Sempre use filtro solar na porção da pele exposta ao sol.  O filtro solar deve bloquear raios ultravioletas (tanto UVA chantal UVB) e ter um fator de proteção solar (FPS) de pelo menos 15. Use-o diariamente, mesmo quando estiver nublado. Alguns médicos podem recomendar um FPS maior, chantal o 30. · Vá ao dentista carla ou duas vezes ao ano, para check-ups e para fazer [employer]. · Use o juarez de segurança no tomás. · Zuleyka álcool com moderação, donny goste de beber. Isso significa não mais de 2 copos por santana para os homens e 1 para as mulheres. Siga o conselho de seu médico em relação às datas de realização de determinados exames. Melanie exames podem detectar problemas precocemente. Para todos  · Colesterol. Faça exames de sangue para verificar o nível do gordura (colesterol) no corpo após completar 20 anos. O médico dirá a frequência com que você deve realizar o exame, com base em sua idade, histórico familiar ou outros fatores que podem aumentar seu risco de desenvolver doença cardiovascular. · Pressão sanguínea. Meça sua pressão sempre que for [employer]. O médico dirá a frequência com que você deve medir a pressão, com base em sua idade, os resultados de seu teste de pressão e outros fatores. · Visão. Pergunte ao médico com que frequência você deve fazer exames para detecção de glaucoma. · Diabetes. Pergunte ao médico se você deve fazer exames para detecção de diabetes. · Câncer colorretal.Faça um exame para detecção de câncer colorretal aos 50 anos de idade. Você pode fazer um dentre diversos exames. Se tiver menos de 50 anos, você pode precisar realizar o exame antecipadamente, donny possua fatores de risco. Fatores de risco incluem o fato de você já ter removido pólipos pré-cancerígenos do cólon ou se algum de seus lake, irmãos, irmãs ou filhos já teve câncer colorretal.  Para mulheres  · [facility]. Pergunte ao médico quando você deve fazer um exame [place] e carla Reji.  Especialistas divergem sobre o momento e a frequência de realização desses exames para mulheres com menos de 50 anos. Seu médico poderá ajudá-la a decidir o que é melhor para você. · Teste de Papanicolau e exame pélvico.Comece a fazer testes de Papanicolau aos 21 anos. Um teste de Papanicolau é a melhor maneira de encontrar câncer cervical. O teste é normalmente parte de um exame pélvico. Pergunte ao médico com que frequência você deve realizar o teste. · Testes para detecção de infecções sexualmente transmissíveis (ISTs). Pergunte ao médico quando você deve realizar exames para detecção de ISTs. Você pode estar correndo risco se angel sexo com mais de carla lidya, principalmente se seus parceiros não usam camisinha. Para homens  · Testes para detecção de infecções sexualmente transmissíveis (ISTs). Pergunte ao médico quando você deve realizar exames para detecção de ISTs. Você pode estar correndo risco se angel sexo com mais de carla lidya, principalmente se você não [place] camisinha. · Exame do câncer de testículo. Pergunte ao médico se você deve verificar seus testículos regularmente. · Exame de próstata. Pergunte ao médico se você deve fazer um exame de sangue (chamado exame PSA) para câncer de próstata. Os especialistas divergem quanto ao momento e frequência com que os homens devem realizar trent teste. Alguns sugerem a quem tem 39 anos ou mais e é afrodescendente ou tem um irmão ou graham que teve câncer de próstata com menos de 65 anos. Quando você deve pedir ajuda? Esteja atento a quaisquer alterações à sua saúde e certifique-se de entrar em contato com seu médico se desenvolver qualquer problema ou sintoma passível de preocupação. Onde você pode aprender mais? Acesse http://www.woods.com/. Digite o P072 Na caixa de busca para saber mais sobre \"Consultas para idades entre 18 e 50 anos: Instruções de cuidados - [ Well Visit, Ages 25 to 48: Care Instructions ]. \"  Na data de: 16 maio, 2017  Versão de conteúdo: 11.4  © 1261-6833 Healthwise, Incorporated.  Instruções de cuidados adaptadas sob licença de seu profissional da saúde. Se você tem dúvidas sobre um estado clínico ou essas instruções, sempre pergunte ao seu profissional da saúde. A Healthwise, Incorporated renúncia a toda e qualquer garantia ou responsabilidade por seu uso dessas informações.

## 2018-07-09 NOTE — PROGRESS NOTES
Chief Complaint   Patient presents with   [facility] Referral Request     OB/GYN at [facility] (Dr. Francine Acuna) for annual pap smear    Other     patient requesting lab order for annual lab panel      1. Have you been to the ER, urgent care clinic since your last visit? Hospitalized since your last visit? No    2. Have you seen or consulted any other health care providers outside of the [address] [place] [address] since your last visit? Include any pap smears or colon screening.  No

## 2018-07-16 ENCOUNTER — HOSPITAL ENCOUNTER (OUTPATIENT)
Dept: LAB | Age: 48
Discharge: HOME | End: 2018-07-16
Payer: OTHER GOVERNMENT

## 2018-07-16 DIAGNOSIS — Z13.220 SCREENING CHOLESTEROL LEVEL: ICD-10-CM

## 2018-07-16 DIAGNOSIS — Z13.29 SCREENING FOR THYROID DISORDER: ICD-10-CM

## 2018-07-16 DIAGNOSIS — R63.5 WEIGHT GAIN: ICD-10-CM

## 2018-07-16 LAB
ALBUMIN SERPL-MCNC: 3.7 G/DL (ref 3.4–5)
ALBUMIN/GLOB SERPL: 1.1 {RATIO} (ref 0.8–1.7)
ALP SERPL-CCNC: 63 U/L (ref 45–117)
ALT SERPL-CCNC: 22 U/L (ref 13–56)
ANION GAP SERPL CALC-SCNC: 9 MMOL/L (ref 3–18)
AST SERPL-CCNC: 17 U/L (ref 15–37)
BASOPHILS # BLD: 0 K/UL (ref 0–0.1)
BASOPHILS NFR BLD: 1 % (ref 0–2)
BILIRUB SERPL-MCNC: 0.4 MG/DL (ref 0.2–1)
BUN SERPL-MCNC: 16 MG/DL (ref 7–18)
BUN/CREAT SERPL: 26 (ref 12–20)
CALCIUM SERPL-MCNC: 8.5 MG/DL (ref 8.5–10.1)
CHLORIDE SERPL-SCNC: 107 MMOL/L (ref 100–108)
CHOLEST SERPL-MCNC: 128 MG/DL
CO2 SERPL-SCNC: 27 MMOL/L (ref 21–32)
CREAT SERPL-MCNC: 0.61 MG/DL (ref 0.6–1.3)
DIFFERENTIAL METHOD BLD: ABNORMAL
EOSINOPHIL # BLD: 0.1 K/UL (ref 0–0.4)
EOSINOPHIL NFR BLD: 2 % (ref 0–5)
ERYTHROCYTE [DISTWIDTH] IN BLOOD BY AUTOMATED COUNT: 12.4 % (ref 11.6–14.5)
GLOBULIN SER CALC-MCNC: 3.5 G/DL (ref 2–4)
GLUCOSE SERPL-MCNC: 91 MG/DL (ref 74–99)
HCT VFR BLD AUTO: 39.7 % (ref 35–45)
HDLC SERPL-MCNC: 65 MG/DL (ref 40–60)
HDLC SERPL: 2 {RATIO} (ref 0–5)
HGB BLD-MCNC: 13 G/DL (ref 12–16)
LDLC SERPL CALC-MCNC: 56.6 MG/DL (ref 0–100)
LIPID PROFILE,FLP: ABNORMAL
LYMPHOCYTES # BLD: 1.9 K/UL (ref 0.9–3.6)
LYMPHOCYTES NFR BLD: 57 % (ref 21–52)
MCH RBC QN AUTO: 27.8 PG (ref 24–34)
MCHC RBC AUTO-ENTMCNC: 32.7 G/DL (ref 31–37)
MCV RBC AUTO: 84.8 FL (ref 74–97)
MONOCYTES # BLD: 0.2 K/UL (ref 0.05–1.2)
MONOCYTES NFR BLD: 6 % (ref 3–10)
NEUTS SEG # BLD: 1.1 K/UL (ref 1.8–8)
NEUTS SEG NFR BLD: 34 % (ref 40–73)
PLATELET # BLD AUTO: 217 K/UL (ref 135–420)
PMV BLD AUTO: 9.9 FL (ref 9.2–11.8)
POTASSIUM SERPL-SCNC: 4.1 MMOL/L (ref 3.5–5.5)
PROT SERPL-MCNC: 7.2 G/DL (ref 6.4–8.2)
RBC # BLD AUTO: 4.68 M/UL (ref 4.2–5.3)
SODIUM SERPL-SCNC: 143 MMOL/L (ref 136–145)
TRIGL SERPL-MCNC: 32 MG/DL (ref ?–150)
TSH SERPL DL<=0.05 MIU/L-ACNC: 1.31 UIU/ML (ref 0.36–3.74)
VLDLC SERPL CALC-MCNC: 6.4 MG/DL
WBC # BLD AUTO: 3.3 K/UL (ref 4.6–13.2)

## 2018-07-16 PROCEDURE — 80053 COMPREHEN METABOLIC PANEL: CPT | Performed by: FAMILY MEDICINE

## 2018-07-16 PROCEDURE — 36415 COLL VENOUS BLD VENIPUNCTURE: CPT | Performed by: FAMILY MEDICINE

## 2018-07-16 PROCEDURE — 85025 COMPLETE CBC W/AUTO DIFF WBC: CPT | Performed by: FAMILY MEDICINE

## 2018-07-16 PROCEDURE — 80061 LIPID PANEL: CPT | Performed by: FAMILY MEDICINE

## 2018-07-16 PROCEDURE — 84443 ASSAY THYROID STIM HORMONE: CPT | Performed by: FAMILY MEDICINE

## 2018-11-01 ENCOUNTER — OFFICE VISIT (OUTPATIENT)
Dept: FAMILY MEDICINE CLINIC | Age: 48
End: 2018-11-01

## 2018-11-01 VITALS
DIASTOLIC BLOOD PRESSURE: 78 MMHG | HEART RATE: 89 BPM | WEIGHT: 162 LBS | BODY MASS INDEX: 24.55 KG/M2 | SYSTOLIC BLOOD PRESSURE: 120 MMHG | HEIGHT: 68 IN | TEMPERATURE: 98.9 F | RESPIRATION RATE: 16 BRPM | OXYGEN SATURATION: 98 %

## 2018-11-01 DIAGNOSIS — J40 BRONCHITIS: Primary | ICD-10-CM

## 2018-11-01 RX ORDER — HYDROCODONE POLISTIREX AND CHLORPHENIRAMINE POLISTIREX 10; 8 MG/5ML; MG/5ML
5 SUSPENSION, EXTENDED RELEASE ORAL
Qty: 115 ML | Refills: 0 | Status: SHIPPED | OUTPATIENT
Start: 2018-11-01

## 2018-11-01 NOTE — PATIENT INSTRUCTIONS
Bronquite: Instruções de cuidados - [ Bronchitis: Care Instructions ] Suas instruções de cuidados A bronquite é a inflamação dos brônquios, que levam o ar aos pulmões. Os brônquios incham e produzem muco, ou catarro. Os brônquios inflamados e com muco fazem você tossir. Você pode ter dificuldade em respirar. A maior Gino Schwab de bronquite é causada por vírus claude os que causam resfriados. Antibióticos normalmente não ajudam e podem ser prejudiciais. A bronquite normalmente se desenvolve rapidamente e dura por volta de 2 a 3 semanas em pessoas antes saudáveis. O acompanhamento é parte crucial do seu tratamento e Roman Joseline. Natacha Flaming a todas as consultas de acompanhamento, e ligue para o médico se você apresentar algum problema. Também é carla boa ideia saber os resultados de seus exames, e guardar carla [employer] que você jose g. Claude cuidar de si mesmo em casa? · [place] os medicamentos exatamente claude indicados. Fale com seu médico se achar que está tendo algum problema com o medicamento. · Descanse bastante. · [place] um medicamento de venda livre para alívio da estefani, claude acetaminofeno (Tylenol), ibuprofeno (Advil, Motrin) ou naproxeno (Aleve) para reduzir a febre e aliviar a estefani no corpo. Joellen e siga todas as instruções da bula. · Não tome dois ou mais medicamentos para a estefani ao mesmo tempo, a menos que solicitado pelo médico. Muitos medicamentos para estefani possuem acetaminofeno, o Tylenol. Paracetamol (Tylenol) em demasia pode ser prejudicial. 
· [place] um medicamento de venda livre para a tosse que contenha dextrometorfano, para ajudar a aliviar carla tosse seca e intermitente, para que você possa dormir. Evite medicamentos para tosse que [employer] de um princípio [place]. Joellen e siga todas as instruções da bula. · Respire ar umidificado em um umidificador, chuveiro quente ou snow com Marlise Banister. O calor e a umidade reduzem o muco, para que você possa tossi-lo para fora. · Não fume. Fumar pode piorar a bronquite. Se precisar de ajuda para parar de fumar, fale com seu médico sobre programas e medicamentos antitabagismo. Isso pode aumentar sua chance de parar definitivamente de fumar. Quando você deve pedir ajuda? Ligue para a emergência(911 nos EUA; 190 no [place]) sempre que achar que precisa de cuidados de emergência. Por exemplo, ligue se: · Você tiver [employer] dificuldade em respirar. Ligue para o médico imediatamenteou procure atendimento médico de emergência se: · Tiver nova dificuldade em respirar, ou se esta piorar. · Você tossir muco com sangue ou de cor escura (sputum). · Você apresentar febre, ou se esta piorar. · Apresentar carla nova erupção. Esteja atento a quaisquer alterações à sua saúde e certifique-se de ligar para o médico se: · Tossir mais forte e com maior frequência, especialmente se você notar mais muco ou carla mudança de cor no muco. 
· Você não estiver ficando melhor, conforme esperado. Onde você pode aprender mais? Acesse http://www.woods.com/. Digite o H333 Na caixa de busca para saber mais sobre \"Bronquite: Instruções de cuidados - [ Bronchitis: Care Instructions ]. \" Na data de: 6 dezembro, 2017 Versão de conteúdo: 11.8 © 4824-4456 [employer]. Instruções de cuidados adaptadas sob licença de seu profissional da saúde. Se você tem dúvidas sobre um estado clínico ou essas instruções, sempre pergunte ao seu profissional da saúde. A [employer] renúncia a toda e qualquer garantia ou responsabilidade por seu uso dessas informações.  
 
Follow up in 8 months for annual physical

## 2018-11-01 NOTE — PROGRESS NOTES
SUBJECTIVE Chief Complaint Patient presents with  Pressure Behind the Eyes  
  ongoing past 3-4 days  Sinus Pain  
  ongoing past 3-4 days  Cough  
  ongoing past 3-4 days , worse at night The patient presents complaining of a 3-4 day history of cough. The cough is described as productive occasionally of clear sputum. Cough bad at night. The patient does have nasal congestion and drainage. The patient reports sinus pressure. The patient denies fevers. The patient denies shortness of breath, chest pain, chest tightness, or wheezing. The patient has tried Mucinex with minimal relief. OBJECTIVE Blood pressure 120/78, pulse 89, temperature 98.9 °F (37.2 °C), resp. rate 16, height 5' 8\" (1.727 m), weight 162 lb (73.5 kg), SpO2 98 %. General:  Alert, cooperative, well appearing, in no apparent distress. Eyes: The lids are without swelling, lesions, or drainage. The conjunctiva is clear and noninjected. ENT:  The septum is midline. The maxillary and frontal sinuses are nontender to palpation. The nasal turbinates are engorged and pale with clear drainage. The tympanic membranes and external auditory canal are normal bilaterally. The tongue and mucous membranes are pink and moist without lesions. The pharynx is non-erythematous without exudates. There is evidence of postnasal drainage. Neck:  supple without adenopathy. CV:  The heart sounds are regular in rate and rhythm. There is a normal S1 and S2. There or no murmurs. Lungs: Inspiratory and expiratory efforts are full and unlabored. Lung sounds are clear and equal to auscultation throughout all lung fields without rhonchi, wheezing or rales. Skin:  No rashes or jaundice. Psych: normal affect. Mood good. Oriented x 3. Judgement and insight intact. ASSESSMENT / PLAN 
  ICD-10-CM ICD-9-CM 1. Bronchitis J40 490 chlorpheniramine-HYDROcodone (TUSSIONEX) 10-8 mg/5 mL suspension Start Tussionex 5mL at night. Cont Mucinex in the daytime. Advised if continues or fevers develop we can try an antibiotic. All chart history elements were reviewed by me at the time of the visit even though marked at time of note closure. Patient understands our medical plan. Patient has provided input and agrees with goals. Alternatives have been explained and offered. All questions answered. The patient is to call if condition worsens or fails to improve.   
 
Follow-up Disposition: 
Return in about 8 months (around 7/1/2019) for annual physical.

## 2018-11-01 NOTE — PROGRESS NOTES
1. Have you been to the ER, urgent care clinic since your last visit? Hospitalized since your last visit? No 
 
2. Have you seen or consulted any other health care providers outside of the [address] [place] [address] since your last visit? Include any pap smears or colon screening.  No

## 2018-12-13 ENCOUNTER — OFFICE VISIT (OUTPATIENT)
Dept: FAMILY MEDICINE CLINIC | Age: 48
End: 2018-12-13

## 2018-12-13 VITALS
WEIGHT: 165 LBS | SYSTOLIC BLOOD PRESSURE: 98 MMHG | BODY MASS INDEX: 25.01 KG/M2 | DIASTOLIC BLOOD PRESSURE: 64 MMHG | HEART RATE: 84 BPM | RESPIRATION RATE: 14 BRPM | TEMPERATURE: 98.3 F | OXYGEN SATURATION: 98 % | HEIGHT: 68 IN

## 2018-12-13 DIAGNOSIS — J40 BRONCHITIS: Primary | ICD-10-CM

## 2018-12-13 DIAGNOSIS — R50.9 FEVER, UNSPECIFIED FEVER CAUSE: ICD-10-CM

## 2018-12-13 LAB
QUICKVUE INFLUENZA TEST: NEGATIVE
VALID INTERNAL CONTROL?: YES

## 2018-12-13 RX ORDER — AZITHROMYCIN 250 MG/1
TABLET, FILM COATED ORAL
Qty: 6 TAB | Refills: 0 | Status: SHIPPED | OUTPATIENT
Start: 2018-12-13 | End: 2018-12-18

## 2018-12-13 NOTE — PROGRESS NOTES
Chief Complaint   Patient presents with    Cough     fever of 102 last night     Nasal Congestion     taking OTC Mucinex     1. Have you been to the ER, urgent care clinic since your last visit? Hospitalized since your last visit? No    2. Have you seen or consulted any other health care providers outside of the [address] [place] [address] since your last visit? Include any pap smears or colon screening.  No

## 2018-12-13 NOTE — PROGRESS NOTES
SUBJECTIVE  Chief Complaint   Patient presents with    Cough     fever of 102 last night     Nasal Congestion     taking OTC Mucinex     The patient presents complaining of a 3 day history of cough. The cough is described as productive of yellowish clear mucus. Her nose is runny and that is yellowish as well. The patient reports slight sinus pressure. The patient reports fevers on the first and second day of illness but not today. The patient denies shortness of breath, chest pain, chest tightness, or wheezing. The patient has tried Tussionex with significant relief. ROS:  Cardiac:  No chest pain or palpitations. GI: The patient denies any nausea or vomiting. No diarrhea or abdominal pain. OBJECTIVE    Blood pressure 98/64, pulse 84, temperature 98.3 °F (36.8 °C), temperature source Oral, resp. rate 14, height 5' 8\" (1.727 m), weight 165 lb (74.8 kg), SpO2 98 %. General:  Alert, cooperative, well appearing, in no apparent distress. Eyes: The lids are without swelling, lesions, or drainage. The conjunctiva is clear and noninjected. ENT:  The maxillary and frontal sinuses are nontender to palpation. The nasal turbinates are engorged with clear drainage. The tympanic membranes and external auditory canal are normal bilaterally. The tongue and mucous membranes are pink and moist without lesions. The pharynx is non-erythematous without exudates. There is evidence of postnasal drainage. Neck:  supple without adenopathy. CV:  The heart sounds are regular in rate and rhythm. There is a normal S1 and S2. There or no murmurs. Lungs: Inspiratory and expiratory efforts are full and unlabored. Lung sounds are clear and equal to auscultation throughout all lung fields without rhonchi, wheezing or rales. Skin:  No rashes or jaundice. Psych: normal affect. Mood good. Oriented x 3. Judgement and insight intact.      Results for orders placed or performed in visit on 12/13/18   AMB POC RAPID INFLUENZA TEST   Result Value Ref Range    VALID INTERNAL CONTROL POC Yes     QuickVue Influenza test Negative Negative     ASSESSMENT / PLAN    ICD-10-CM ICD-9-CM    1. Bronchitis J40 490    2. Fever, unspecified fever cause R50.9 780.60 AMB POC RAPID INFLUENZA TEST     Monitor symptoms to resolution. Can continue Tussionex. Keep a z-oswaldo on hand since she is travelling. Use of Afrin will help in short term. Not to be used beyond 3-4 days. The patient was encouraged to drink plenty of fluids. The patient was instructed to follow-up if their condition worsened or persisted. All chart history elements were reviewed by me at the time of the visit even though marked at time of note closure. Patient understands our medical plan. Patient has provided input and agrees with goals. Alternatives have been explained and offered. All questions answered. The patient is to call if condition worsens or fails to improve.      Follow-up Disposition:  Return July 2019 for physical.

## 2018-12-13 NOTE — PATIENT INSTRUCTIONS
Viral Respiratory Infection: Care Instructions  Your Care Instructions    Viruses are very small organisms. They grow in number after they enter your body. There are many types that cause different illnesses, such as colds and the mumps. The symptoms of a viral respiratory infection often start quickly. They include a fever, sore throat, and runny nose. You may also just not feel well. Or you may not want to eat much. Most viral respiratory infections are not serious. They usually get better with time and self-care. Antibiotics are not used to treat a viral infection. That's because antibiotics will not help cure a viral illness. In some cases, antiviral medicine can help your body fight a serious viral infection. Follow-up care is a key part of your treatment and safety. Be sure to make and go to all appointments, and call your doctor if you are having problems. It's also a good idea to know your test results and keep a list of the medicines you take. How can you care for yourself at home? · Rest as much as possible until you feel better. · Be safe with medicines. Take your medicine exactly as prescribed. Call your doctor if you think you are having a problem with your medicine. You will get more details on the specific medicine your doctor prescribes. · Take an over-the-counter pain medicine, such as acetaminophen (Tylenol), ibuprofen (Advil, Motrin), or naproxen (Aleve), as needed for pain and fever. Read and follow all instructions on the label. Do not give aspirin to anyone younger than 20. It has been linked to Reye syndrome, a serious illness. · Drink plenty of fluids, enough so that your urine is light yellow or clear like water. Hot fluids, such as tea or soup, may help relieve congestion in your nose and throat. If you have kidney, heart, or liver disease and have to limit fluids, talk with your doctor before you increase the amount of fluids you drink.   · Try to clear mucus from your lungs by breathing deeply and coughing. · Gargle with warm salt water once an hour. This can help reduce swelling and throat pain. Use 1 teaspoon of salt mixed in 1 cup of warm water. · Do not smoke or allow others to smoke around you. If you need help quitting, talk to your doctor about stop-smoking programs and medicines. These can increase your chances of quitting for good. To avoid spreading the virus  · Cough or sneeze into a tissue. Then throw the tissue away. · If you don't have a tissue, use your hand to cover your cough or sneeze. Then clean your hand. You can also cough into your sleeve. · Wash your hands often. Use soap and warm water. Wash for 15 to 20 seconds each time. · If you don't have soap and water near you, you can clean your hands with alcohol wipes or gel. When should you call for help? Call your doctor now or seek immediate medical care if:    · You have a new or higher fever.     · Your fever lasts more than 48 hours.     · You have trouble breathing.     · You have a fever with a stiff neck or a severe headache.     · You are sensitive to light.     · You feel very sleepy or confused.    Watch closely for changes in your health, and be sure to contact your doctor if:    · You do not get better as expected. Where can you learn more? Go to http://robinson-pete.info/. Enter D339 in the search box to learn more about \"Viral Respiratory Infection: Care Instructions. \"  Current as of: December 6, 2017  Content Version: 11.8  © 7057-3406 [employer]. Care instructions adapted under license by [employer] (which disclaims liability or warranty for this information). If you have questions about a medical condition or this instruction, always ask your healthcare professional. [address] any warranty or liability for your use of this information.

## 2019-05-31 ENCOUNTER — HOSPITAL ENCOUNTER (OUTPATIENT)
Dept: MAMMOGRAPHY | Age: 49
Discharge: HOME | End: 2019-05-31
Attending: FAMILY MEDICINE
Payer: OTHER GOVERNMENT

## 2019-05-31 DIAGNOSIS — Z12.31 VISIT FOR SCREENING MAMMOGRAM: ICD-10-CM

## 2019-05-31 PROCEDURE — 77067 SCR MAMMO BI INCL CAD: CPT

## 2020-03-03 ENCOUNTER — APPOINTMENT (RX ONLY)
Dept: URBAN - METROPOLITAN AREA CLINIC 71 | Facility: CLINIC | Age: 50
Setting detail: DERMATOLOGY
End: 2020-03-03

## 2020-03-03 DIAGNOSIS — Z71.89 OTHER SPECIFIED COUNSELING: ICD-10-CM

## 2020-03-03 DIAGNOSIS — L82.1 OTHER SEBORRHEIC KERATOSIS: ICD-10-CM

## 2020-03-03 DIAGNOSIS — L81.4 OTHER MELANIN HYPERPIGMENTATION: ICD-10-CM

## 2020-03-03 PROCEDURE — ? COUNSELING

## 2020-03-03 PROCEDURE — ? PRESCRIPTION

## 2020-03-03 PROCEDURE — ? FULL BODY SKIN EXAM - DECLINED

## 2020-03-03 PROCEDURE — ? ADDITIONAL NOTES

## 2020-03-03 PROCEDURE — 99203 OFFICE O/P NEW LOW 30 MIN: CPT

## 2020-03-03 RX ORDER — TRETIONIN 0.25 MG/G
CREAM TOPICAL
Qty: 1 | Refills: 3 | Status: ERX | COMMUNITY
Start: 2020-03-03

## 2020-03-03 RX ADMIN — TRETIONIN: 0.25 CREAM TOPICAL at 00:00

## 2020-03-03 ASSESSMENT — LOCATION SIMPLE DESCRIPTION DERM: LOCATION SIMPLE: LEFT ANTERIOR NECK

## 2020-03-03 ASSESSMENT — LOCATION ZONE DERM: LOCATION ZONE: NECK

## 2020-03-03 ASSESSMENT — LOCATION DETAILED DESCRIPTION DERM: LOCATION DETAILED: LEFT INFERIOR ANTERIOR NECK

## 2020-03-03 NOTE — PROCEDURE: ADDITIONAL NOTES
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 Patient has had laser before in the past and liked this procedure. I recommended patient to continue with laser treatment and she was referred today.
Detail Level: Generalized

## 2021-12-08 ENCOUNTER — APPOINTMENT (OUTPATIENT)
Age: 51
Setting detail: DERMATOLOGY
End: 2021-12-08

## 2021-12-08 ENCOUNTER — APPOINTMENT (RX ONLY)
Age: 51
Setting detail: DERMATOLOGY
End: 2021-12-08

## 2021-12-08 VITALS
SYSTOLIC BLOOD PRESSURE: 118 MMHG | DIASTOLIC BLOOD PRESSURE: 72 MMHG | DIASTOLIC BLOOD PRESSURE: 72 MMHG | SYSTOLIC BLOOD PRESSURE: 118 MMHG

## 2021-12-08 DIAGNOSIS — L20.89 OTHER ATOPIC DERMATITIS: ICD-10-CM

## 2021-12-08 PROBLEM — L20.84 INTRINSIC (ALLERGIC) ECZEMA: Status: ACTIVE | Noted: 2021-12-08

## 2021-12-08 PROCEDURE — ? TREATMENT REGIMEN

## 2021-12-08 PROCEDURE — ? COUNSELING

## 2021-12-08 PROCEDURE — 99203 OFFICE O/P NEW LOW 30 MIN: CPT

## 2021-12-08 PROCEDURE — ? PRESCRIPTION

## 2021-12-08 RX ORDER — CLOBETASOL PROPIONATE 0.5 MG/G
OINTMENT TOPICAL
Qty: 45 | Refills: 0 | Status: ERX | COMMUNITY
Start: 2021-12-08

## 2021-12-08 RX ORDER — CLOBETASOL PROPIONATE 0.5 MG/G
APPLY CREAM TOPICAL BID
Qty: 30 | Refills: 1 | Status: CANCELLED
Stop reason: CLARIF

## 2021-12-08 RX ADMIN — CLOBETASOL PROPIONATE APPLY: 0.5 OINTMENT TOPICAL at 00:00

## 2021-12-08 ASSESSMENT — LOCATION ZONE DERM
LOCATION ZONE: FEET
LOCATION ZONE: FEET

## 2021-12-08 ASSESSMENT — LOCATION SIMPLE DESCRIPTION DERM
LOCATION SIMPLE: RIGHT HEEL
LOCATION SIMPLE: RIGHT HEEL

## 2021-12-08 ASSESSMENT — LOCATION DETAILED DESCRIPTION DERM
LOCATION DETAILED: RIGHT HEEL
LOCATION DETAILED: RIGHT HEEL

## 2021-12-08 NOTE — PROCEDURE: MIPS QUALITY
Quality 431: Preventive Care And Screening: Unhealthy Alcohol Use - Screening: Patient not identified as an unhealthy alcohol user when screened for unhealthy alcohol use using a systematic screening method
Quality 226: Preventive Care And Screening: Tobacco Use: Screening And Cessation Intervention: Patient screened for tobacco use and is an ex/non-smoker
Quality 111:Pneumonia Vaccination Status For Older Adults: Pneumococcal Vaccination not Administered or Previously Received, Reason not Otherwise Specified
Quality 110: Preventive Care And Screening: Influenza Immunization: Influenza Immunization previously received during influenza season
Detail Level: Detailed

## 2021-12-08 NOTE — PROCEDURE: TREATMENT REGIMEN
Plan: She is told to apply the clobetasol twice a day for two weeks. She will apply Vaseline over the cream. She is told to wrap the foot at night to keep from rubbing it
Initiate Treatment: Clobetasol cream twice a day for 2 weeks
Detail Level: Detailed
Discontinue Regimen: Mupirocin

## 2021-12-08 NOTE — PROCEDURE: TREATMENT REGIMEN
Plan: She is told to apply the clobetasol twice a day for two weeks. She will apply Vaseline over the cream. She is told to wrap the foot at night to keep from rubbing it

## 2021-12-08 NOTE — PROCEDURE: MIPS QUALITY
Quality 111:Pneumonia Vaccination Status For Older Adults: Pneumococcal Vaccination not Administered or Previously Received, Reason not Otherwise Specified
Detail Level: Detailed
Quality 431: Preventive Care And Screening: Unhealthy Alcohol Use - Screening: Patient not identified as an unhealthy alcohol user when screened for unhealthy alcohol use using a systematic screening method
Quality 226: Preventive Care And Screening: Tobacco Use: Screening And Cessation Intervention: Patient screened for tobacco use and is an ex/non-smoker
Quality 110: Preventive Care And Screening: Influenza Immunization: Influenza Immunization previously received during influenza season

## 2022-01-12 ENCOUNTER — APPOINTMENT (OUTPATIENT)
Age: 52
Setting detail: DERMATOLOGY
End: 2022-01-12

## 2022-01-12 ENCOUNTER — APPOINTMENT (RX ONLY)
Age: 52
Setting detail: DERMATOLOGY
End: 2022-01-12

## 2022-01-12 VITALS
DIASTOLIC BLOOD PRESSURE: 72 MMHG | SYSTOLIC BLOOD PRESSURE: 118 MMHG | SYSTOLIC BLOOD PRESSURE: 118 MMHG | DIASTOLIC BLOOD PRESSURE: 72 MMHG

## 2022-01-12 DIAGNOSIS — D485 NEOPLASM OF UNCERTAIN BEHAVIOR OF SKIN: ICD-10-CM

## 2022-01-12 DIAGNOSIS — L20.89 OTHER ATOPIC DERMATITIS: ICD-10-CM

## 2022-01-12 PROBLEM — D48.5 NEOPLASM OF UNCERTAIN BEHAVIOR OF SKIN: Status: ACTIVE | Noted: 2022-01-12

## 2022-01-12 PROBLEM — L20.84 INTRINSIC (ALLERGIC) ECZEMA: Status: ACTIVE | Noted: 2022-01-12

## 2022-01-12 PROCEDURE — 99213 OFFICE O/P EST LOW 20 MIN: CPT | Mod: 25

## 2022-01-12 PROCEDURE — ? TREATMENT REGIMEN

## 2022-01-12 PROCEDURE — 11104 PUNCH BX SKIN SINGLE LESION: CPT

## 2022-01-12 PROCEDURE — ? COUNSELING

## 2022-01-12 PROCEDURE — ? BIOPSY BY PUNCH METHOD

## 2022-01-12 ASSESSMENT — LOCATION SIMPLE DESCRIPTION DERM
LOCATION SIMPLE: RIGHT ACHILLES SKIN
LOCATION SIMPLE: RIGHT HEEL
LOCATION SIMPLE: RIGHT ACHILLES SKIN
LOCATION SIMPLE: RIGHT HEEL

## 2022-01-12 ASSESSMENT — LOCATION ZONE DERM
LOCATION ZONE: FEET
LOCATION ZONE: FEET
LOCATION ZONE: LEG
LOCATION ZONE: LEG

## 2022-01-12 ASSESSMENT — LOCATION DETAILED DESCRIPTION DERM
LOCATION DETAILED: RIGHT ACHILLES SKIN
LOCATION DETAILED: RIGHT ACHILLES SKIN
LOCATION DETAILED: RIGHT HEEL
LOCATION DETAILED: RIGHT HEEL

## 2022-01-12 NOTE — PROCEDURE: BIOPSY BY PUNCH METHOD
Body Location Override (Optional - Billing Will Still Be Based On Selected Body Map Location If Applicable): right Achilles skin (3mm punch)
Detail Level: Detailed
Was A Bandage Applied: Yes
Punch Size In Mm: 3
Biopsy Type: H and E
Anesthesia Type: 0.5% lidocaine with 1:200,000 epinephrine and a 1:10 solution of 8.4% sodium bicarbonate
Anesthesia Volume In Cc: 0.5
Additional Anesthesia Volume In Cc (Will Not Render If 0): 0
Hemostasis: None
Epidermal Sutures: 4-0 Nylon
Number Of Epidermal Sutures (Optional): 2
Wound Care: Bacitracin
Dressing: pressure dressing with telfa
Suture Removal: 14 days
Patient Will Remove Sutures At Home?: No
Size Of Lesion In Cm (Optional): 4
Lab: -[id number]
Path Notes (To The Dermatopathologist): Please do KOH
Consent: Written consent was obtained and risks were reviewed including but not limited to scarring, infection, bleeding, scabbing, incomplete removal, nerve damage and allergy to anesthesia.
Post-Care Instructions: I reviewed with the patient in detail post-care instructions. Patient is to keep the biopsy site dry overnight, and then apply bacitracin twice daily until healed. Patient may apply hydrogen peroxide soaks to remove any crusting.
Home Suture Removal Text: Patient was provided a home suture removal kit and will remove their sutures at home.  If they have any questions or difficulties they will call the office.
Notification Instructions: Patient will be notified of biopsy results. However, patient instructed to call the office if not contacted within 2 weeks.
Billing Type: Third-Party Bill
Information: Selecting Yes will display possible errors in your note based on the variables you have selected. This validation is only offered as a suggestion for you. PLEASE NOTE THAT THE VALIDATION TEXT WILL BE REMOVED WHEN YOU FINALIZE YOUR NOTE. IF YOU WANT TO FAX A PRELIMINARY NOTE YOU WILL NEED TO TOGGLE THIS TO 'NO' IF YOU DO NOT WANT IT IN YOUR FAXED NOTE.

## 2022-01-12 NOTE — PROCEDURE: BIOPSY BY PUNCH METHOD
Consent: Written consent was obtained and risks were reviewed including but not limited to scarring, infection, bleeding, scabbing, incomplete removal, nerve damage and allergy to anesthesia.
Patient Will Remove Sutures At Home?: No
Home Suture Removal Text: Patient was provided a home suture removal kit and will remove their sutures at home.  If they have any questions or difficulties they will call the office.
X Depth Of Punch In Cm (Optional): 0
Anesthesia Type: 0.5% lidocaine with 1:200,000 epinephrine and a 1:10 solution of 8.4% sodium bicarbonate
Was A Bandage Applied: Yes
Wound Care: Bacitracin
Biopsy Type: H and E
Detail Level: Detailed
Punch Size In Mm: 3
Hemostasis: None
Billing Type: Third-Party Bill
Information: Selecting Yes will display possible errors in your note based on the variables you have selected. This validation is only offered as a suggestion for you. PLEASE NOTE THAT THE VALIDATION TEXT WILL BE REMOVED WHEN YOU FINALIZE YOUR NOTE. IF YOU WANT TO FAX A PRELIMINARY NOTE YOU WILL NEED TO TOGGLE THIS TO 'NO' IF YOU DO NOT WANT IT IN YOUR FAXED NOTE.
Suture Removal: 14 days
Path Notes (To The Dermatopathologist): Please do KOH
Number Of Epidermal Sutures (Optional): 2
Body Location Override (Optional - Billing Will Still Be Based On Selected Body Map Location If Applicable): right Achilles skin (3mm punch)
Anesthesia Volume In Cc: 0.5
Dressing: pressure dressing with telfa
Epidermal Sutures: 4-0 Nylon
Post-Care Instructions: I reviewed with the patient in detail post-care instructions. Patient is to keep the biopsy site dry overnight, and then apply bacitracin twice daily until healed. Patient may apply hydrogen peroxide soaks to remove any crusting.
Notification Instructions: Patient will be notified of biopsy results. However, patient instructed to call the office if not contacted within 2 weeks.
Size Of Lesion In Cm (Optional): 4

## 2022-01-12 NOTE — PROCEDURE: TREATMENT REGIMEN
Plan: Punch Biopsy was done to rule out tinea pedis, atopic dermatitis and psoriasis. She use the clobetasol twice a day for two more weeks.
Detail Level: Detailed
Continue Regimen: Clobetasol cream twice a day for two weeks. Apply Vaseline over it.

## 2022-01-12 NOTE — PROCEDURE: MIPS QUALITY
Quality 110: Preventive Care And Screening: Influenza Immunization: Influenza Immunization previously received during influenza season
Quality 111:Pneumonia Vaccination Status For Older Adults: Pneumococcal Vaccination not Administered or Previously Received, Reason not Otherwise Specified
Detail Level: Detailed
Quality 431: Preventive Care And Screening: Unhealthy Alcohol Use - Screening: Patient not identified as an unhealthy alcohol user when screened for unhealthy alcohol use using a systematic screening method
Quality 226: Preventive Care And Screening: Tobacco Use: Screening And Cessation Intervention: Patient screened for tobacco use and is an ex/non-smoker

## 2022-01-12 NOTE — PROCEDURE: TREATMENT REGIMEN
Detail Level: Detailed
Plan: Punch Biopsy was done to rule out tinea pedis, atopic dermatitis and psoriasis. She use the clobetasol twice a day for two more weeks.
Continue Regimen: Clobetasol cream twice a day for two weeks. Apply Vaseline over it.

## 2022-01-24 ENCOUNTER — RX ONLY (RX ONLY)
Age: 52
End: 2022-01-24

## 2022-01-24 ENCOUNTER — RX ONLY (OUTPATIENT)
Age: 52
Setting detail: RX ONLY
End: 2022-01-24

## 2022-01-24 RX ORDER — CLOBETASOL PROPIONATE 0.5 MG/G
1 OINTMENT TOPICAL BID
Qty: 30 | Refills: 0 | Status: ERX | COMMUNITY
Start: 2022-01-24

## 2022-01-24 RX ORDER — CLOBETASOL PROPIONATE 0.5 MG/G
1 CREAM TOPICAL BID
Qty: 30 | Refills: 0 | Status: CANCELLED | COMMUNITY
Start: 2022-01-24

## 2022-02-02 ENCOUNTER — APPOINTMENT (OUTPATIENT)
Age: 52
Setting detail: DERMATOLOGY
End: 2022-02-02

## 2022-02-02 ENCOUNTER — APPOINTMENT (RX ONLY)
Age: 52
Setting detail: DERMATOLOGY
End: 2022-02-02

## 2022-02-02 DIAGNOSIS — L20.89 OTHER ATOPIC DERMATITIS: ICD-10-CM

## 2022-02-02 DIAGNOSIS — Z48.02 ENCOUNTER FOR REMOVAL OF SUTURES: ICD-10-CM

## 2022-02-02 PROBLEM — L20.84 INTRINSIC (ALLERGIC) ECZEMA: Status: ACTIVE | Noted: 2022-02-02

## 2022-02-02 PROCEDURE — ? DUPIXENT INJECTION

## 2022-02-02 PROCEDURE — 96372 THER/PROPH/DIAG INJ SC/IM: CPT

## 2022-02-02 PROCEDURE — ? COUNSELING

## 2022-02-02 PROCEDURE — ? SUTURE REMOVAL (GLOBAL PERIOD)

## 2022-02-02 PROCEDURE — ? TREATMENT REGIMEN

## 2022-02-02 ASSESSMENT — LOCATION SIMPLE DESCRIPTION DERM
LOCATION SIMPLE: RIGHT HEEL
LOCATION SIMPLE: ABDOMEN
LOCATION SIMPLE: ABDOMEN
LOCATION SIMPLE: RIGHT HEEL

## 2022-02-02 ASSESSMENT — LOCATION DETAILED DESCRIPTION DERM
LOCATION DETAILED: RIGHT LATERAL ABDOMEN
LOCATION DETAILED: RIGHT HEEL
LOCATION DETAILED: LEFT LATERAL ABDOMEN
LOCATION DETAILED: LEFT LATERAL ABDOMEN
LOCATION DETAILED: RIGHT HEEL
LOCATION DETAILED: RIGHT LATERAL ABDOMEN

## 2022-02-02 ASSESSMENT — LOCATION ZONE DERM
LOCATION ZONE: FEET
LOCATION ZONE: TRUNK
LOCATION ZONE: TRUNK
LOCATION ZONE: FEET

## 2022-02-02 NOTE — PROCEDURE: TREATMENT REGIMEN
Initiate Treatment: Vaseline to be used daily.
Discontinue Regimen: Clobetasol
Detail Level: Detailed
Plan: Paperwork for dupixent is started.
[place]

## 2022-02-02 NOTE — PROCEDURE: SUTURE REMOVAL (GLOBAL PERIOD)
Body Location Override (Optional - Billing Will Still Be Based On Selected Body Map Location If Applicable): right Achilles skin
Detail Level: Detailed
Add 63289 Cpt? (Important Note: In 2017 The Use Of 69182 Is Being Tracked By Cms To Determine Future Global Period Reimbursement For Global Periods): no

## 2022-02-02 NOTE — PROCEDURE: DUPIXENT INJECTION
Hide Non-Enhanced Ndc Variable: No
Dupixent Dosing: 300 mg
Treatment Number (Optional): 1
Syringe Size Used (Required For Enhanced Ndc): 300 mg/2ml prefilled pen
Administered By (Optional): Nguyen Philippe PA-C
Ndc (200 Mg Prefilled Syringe): [address]-74
Ndc (300 Mg Prefilled Syringe): [address]-66
Consent: The risks of pain and injection site reactions were reviewed with the patient prior to the injection.
Ndc (300 Mg Prefilled Pen): [address]-22
J-Code: 
Additional Comments: 300mg injected in right abdomen and 300mg injected in left abdomen
Expiration Date (Optional): 2023-09-30
Lot # (Optional): [id number]
Detail Level: None
Render If Medication Purchased By Clinic In Visit Note?: Yes

## 2022-02-02 NOTE — PROCEDURE: DUPIXENT INJECTION
J-Code: 
Hide Non-Enhanced Ndc Variable: No
Ndc (300 Mg Prefilled Pen): [address]-15
95603 Billing Preferences: 1
Administered By (Optional): Mildred Abreu PA-C
Use Enhanced Ndc?: Yes
Lot # (Optional): [id number]
Ndc (200 Mg Prefilled Syringe): [address]-62
Syringe Size Used (Required For Enhanced Ndc): 300 mg/2ml prefilled pen
Consent: The risks of pain and injection site reactions were reviewed with the patient prior to the injection.
Ndc (300 Mg Prefilled Syringe): [address]-18
Dupixent Dosing: 300 mg
Additional Comments: 300mg injected in right abdomen and 300mg injected in left abdomen
Detail Level: None
Expiration Date (Optional): 2023-09-30

## 2022-02-02 NOTE — PROCEDURE: TREATMENT REGIMEN
Discontinue Regimen: Clobetasol
Plan: Paperwork for dupixent is started.
Initiate Treatment: Vaseline to be used daily.
Detail Level: Detailed

## 2022-02-02 NOTE — PROCEDURE: SUTURE REMOVAL (GLOBAL PERIOD)
Detail Level: Detailed
Body Location Override (Optional - Billing Will Still Be Based On Selected Body Map Location If Applicable): right Achilles skin
Add 24083 Cpt? (Important Note: In 2017 The Use Of 22887 Is Being Tracked By Cms To Determine Future Global Period Reimbursement For Global Periods): no

## 2022-02-16 ENCOUNTER — APPOINTMENT (RX ONLY)
Age: 52
Setting detail: DERMATOLOGY
End: 2022-02-16

## 2022-02-16 ENCOUNTER — APPOINTMENT (OUTPATIENT)
Age: 52
Setting detail: DERMATOLOGY
End: 2022-02-16

## 2022-02-16 VITALS
DIASTOLIC BLOOD PRESSURE: 74 MMHG | DIASTOLIC BLOOD PRESSURE: 74 MMHG | SYSTOLIC BLOOD PRESSURE: 118 MMHG | SYSTOLIC BLOOD PRESSURE: 118 MMHG

## 2022-02-16 DIAGNOSIS — L20.89 OTHER ATOPIC DERMATITIS: ICD-10-CM

## 2022-02-16 PROBLEM — L20.84 INTRINSIC (ALLERGIC) ECZEMA: Status: ACTIVE | Noted: 2022-02-16

## 2022-02-16 PROCEDURE — ? DUPIXENT INJECTION

## 2022-02-16 PROCEDURE — ? PRESCRIPTION

## 2022-02-16 PROCEDURE — 96372 THER/PROPH/DIAG INJ SC/IM: CPT

## 2022-02-16 PROCEDURE — ? TREATMENT REGIMEN

## 2022-02-16 PROCEDURE — ? COUNSELING

## 2022-02-16 RX ORDER — TACROLIMUS 1 MG/G
APPLY OINTMENT TOPICAL BID
Qty: 30 | Refills: 2 | Status: ERX | COMMUNITY
Start: 2022-02-16

## 2022-02-16 RX ADMIN — TACROLIMUS APPLY: 1 OINTMENT TOPICAL at 00:00

## 2022-02-16 ASSESSMENT — LOCATION ZONE DERM
LOCATION ZONE: FEET
LOCATION ZONE: TRUNK
LOCATION ZONE: FEET
LOCATION ZONE: TRUNK
LOCATION ZONE: LEG
LOCATION ZONE: LEG

## 2022-02-16 ASSESSMENT — LOCATION SIMPLE DESCRIPTION DERM
LOCATION SIMPLE: RIGHT HEEL
LOCATION SIMPLE: RIGHT ACHILLES SKIN
LOCATION SIMPLE: ABDOMEN
LOCATION SIMPLE: RIGHT HEEL
LOCATION SIMPLE: ABDOMEN
LOCATION SIMPLE: RIGHT ACHILLES SKIN

## 2022-02-16 ASSESSMENT — LOCATION DETAILED DESCRIPTION DERM
LOCATION DETAILED: RIGHT HEEL
LOCATION DETAILED: RIGHT LATERAL ABDOMEN
LOCATION DETAILED: RIGHT LATERAL ABDOMEN
LOCATION DETAILED: RIGHT HEEL
LOCATION DETAILED: RIGHT ACHILLES SKIN
LOCATION DETAILED: RIGHT ACHILLES SKIN

## 2022-02-16 NOTE — PROCEDURE: TREATMENT REGIMEN
Initiate Treatment: Protopic ointment to be applied to the right heel twice a day until resolved. Then as needed.
Discontinue Regimen: Clobetasol
Detail Level: Detailed
Plan: The patient signed the paperwork for dupixent today.
Plan: Discussed tx. plan with pt. and .

## 2022-02-16 NOTE — PROCEDURE: DUPIXENT INJECTION
Hide Non-Enhanced Ndc Variable: No
Ndc (300 Mg Prefilled Syringe): [address]-61
J-Code: 
Ndc (300 Mg Prefilled Pen): [address]-09
Lot # (Optional): [id number]
Dupixent Dosing: 300 mg
Ndc (200 Mg Prefilled Syringe): [address]-21
79199 Billing Preferences: 1
Detail Level: None
Administered By (Optional): Ngyuen Philippe PA-C
Use Enhanced Ndc?: Yes
Expiration Date (Optional): 2023-09-30
Consent: The risks of pain and injection site reactions were reviewed with the patient prior to the injection.
Syringe Size Used (Required For Enhanced Ndc): 300 mg/2ml prefilled pen
Additional Comments: This is her second injection

## 2022-02-16 NOTE — PROCEDURE: TREATMENT REGIMEN
Detail Level: Detailed
Plan: Discussed tx. plan with pt. and .
Initiate Treatment: Protopic ointment to be applied to the right heel twice a day until resolved. Then as needed.
Discontinue Regimen: Clobetasol
Plan: The patient signed the paperwork for dupixent today.

## 2022-02-16 NOTE — PROCEDURE: DUPIXENT INJECTION
Use Enhanced Ndc?: Yes
Administered By (Optional): Mildred Abreu PA-C
Lot # (Optional): [id number]
Syringe Size Used (Required For Enhanced Ndc): 300 mg/2ml prefilled pen
Ndc (200 Mg Prefilled Syringe): [address]-35
Hide Non-Enhanced Ndc Variable: No
Dupixent Dosing: 300 mg
Consent: The risks of pain and injection site reactions were reviewed with the patient prior to the injection.
[address]994 Billing Preferences: 1
Additional Comments: This is her second injection
Detail Level: None
Ndc (300 Mg Prefilled Syringe): [address]-35
Expiration Date (Optional): 2023-09-30
J-Code: 
Ndc (300 Mg Prefilled Pen): [address]-00

## 2022-04-20 ENCOUNTER — APPOINTMENT (RX ONLY)
Age: 52
Setting detail: DERMATOLOGY
End: 2022-04-20

## 2022-04-20 ENCOUNTER — APPOINTMENT (OUTPATIENT)
Age: 52
Setting detail: DERMATOLOGY
End: 2022-04-20

## 2022-04-20 VITALS
SYSTOLIC BLOOD PRESSURE: 120 MMHG | SYSTOLIC BLOOD PRESSURE: 120 MMHG | DIASTOLIC BLOOD PRESSURE: 70 MMHG | DIASTOLIC BLOOD PRESSURE: 70 MMHG

## 2022-04-20 DIAGNOSIS — L82.1 OTHER SEBORRHEIC KERATOSIS: ICD-10-CM

## 2022-04-20 DIAGNOSIS — L20.89 OTHER ATOPIC DERMATITIS: ICD-10-CM | Status: WELL CONTROLLED

## 2022-04-20 DIAGNOSIS — Z41.9 ENCOUNTER FOR PROCEDURE FOR PURPOSES OTHER THAN REMEDYING HEALTH STATE, UNSPECIFIED: ICD-10-CM

## 2022-04-20 PROBLEM — L20.84 INTRINSIC (ALLERGIC) ECZEMA: Status: ACTIVE | Noted: 2022-04-20

## 2022-04-20 PROCEDURE — ? COUNSELING

## 2022-04-20 PROCEDURE — ? TREATMENT REGIMEN

## 2022-04-20 PROCEDURE — 99213 OFFICE O/P EST LOW 20 MIN: CPT

## 2022-04-20 PROCEDURE — ? PRESCRIPTION

## 2022-04-20 RX ORDER — FLUOCINOLONE ACETONIDE, HYDROQUINONE, AND TRETINOIN .1; 40; .5 MG/G; MG/G; MG/G
1 CREAM TOPICAL QHS
Qty: 30 | Refills: 2 | Status: CANCELLED
Stop reason: CLARIF

## 2022-04-20 ASSESSMENT — LOCATION DETAILED DESCRIPTION DERM
LOCATION DETAILED: NASAL ROOT
LOCATION DETAILED: LEFT INFERIOR CENTRAL MALAR CHEEK
LOCATION DETAILED: NASAL ROOT
LOCATION DETAILED: LEFT CENTRAL MALAR CHEEK
LOCATION DETAILED: RIGHT INFERIOR CENTRAL MALAR CHEEK
LOCATION DETAILED: INFERIOR MID FOREHEAD
LOCATION DETAILED: INFERIOR MID FOREHEAD
LOCATION DETAILED: RIGHT ACHILLES SKIN
LOCATION DETAILED: NASAL ROOT
LOCATION DETAILED: RIGHT INFERIOR CENTRAL MALAR CHEEK
LOCATION DETAILED: RIGHT ACHILLES SKIN
LOCATION DETAILED: LEFT INFERIOR CENTRAL MALAR CHEEK
LOCATION DETAILED: RIGHT INFERIOR CENTRAL MALAR CHEEK
LOCATION DETAILED: LEFT CENTRAL MALAR CHEEK
LOCATION DETAILED: NASAL ROOT
LOCATION DETAILED: RIGHT INFERIOR CENTRAL MALAR CHEEK

## 2022-04-20 ASSESSMENT — LOCATION ZONE DERM
LOCATION ZONE: FACE
LOCATION ZONE: NOSE
LOCATION ZONE: LEG
LOCATION ZONE: NOSE
LOCATION ZONE: LEG
LOCATION ZONE: NOSE
LOCATION ZONE: NOSE

## 2022-04-20 ASSESSMENT — LOCATION SIMPLE DESCRIPTION DERM
LOCATION SIMPLE: LEFT CHEEK
LOCATION SIMPLE: RIGHT CHEEK
LOCATION SIMPLE: NOSE
LOCATION SIMPLE: INFERIOR FOREHEAD
LOCATION SIMPLE: LEFT CHEEK
LOCATION SIMPLE: RIGHT CHEEK
LOCATION SIMPLE: RIGHT CHEEK
LOCATION SIMPLE: LEFT CHEEK
LOCATION SIMPLE: LEFT CHEEK
LOCATION SIMPLE: NOSE
LOCATION SIMPLE: RIGHT ACHILLES SKIN
LOCATION SIMPLE: RIGHT CHEEK
LOCATION SIMPLE: RIGHT ACHILLES SKIN
LOCATION SIMPLE: INFERIOR FOREHEAD

## 2022-04-20 NOTE — HPI: DISCOLORATION (HYPERPIGMENTATION)
Is This A New Presentation, Or A Follow-Up?: Discoloration
How Severe Is It?: moderate
Additional History: She  notes she had laser treatment 1 year ago last winter.  She has had 3 laser treatment total. The laser was done in [place]. She’s more concerned about the PIH. Her mother, sisters and brother has the same issue.
